# Patient Record
Sex: FEMALE | Race: WHITE | NOT HISPANIC OR LATINO
[De-identification: names, ages, dates, MRNs, and addresses within clinical notes are randomized per-mention and may not be internally consistent; named-entity substitution may affect disease eponyms.]

---

## 2018-04-11 ENCOUNTER — APPOINTMENT (OUTPATIENT)
Dept: MRI IMAGING | Facility: CLINIC | Age: 83
End: 2018-04-11
Payer: MEDICARE

## 2018-04-11 ENCOUNTER — OUTPATIENT (OUTPATIENT)
Dept: OUTPATIENT SERVICES | Facility: HOSPITAL | Age: 83
LOS: 1 days | End: 2018-04-11
Payer: MEDICARE

## 2018-04-11 DIAGNOSIS — Z00.8 ENCOUNTER FOR OTHER GENERAL EXAMINATION: ICD-10-CM

## 2018-04-11 PROCEDURE — 73721 MRI JNT OF LWR EXTRE W/O DYE: CPT

## 2018-04-11 PROCEDURE — 73721 MRI JNT OF LWR EXTRE W/O DYE: CPT | Mod: 26,LT

## 2020-09-29 ENCOUNTER — APPOINTMENT (OUTPATIENT)
Dept: PULMONOLOGY | Facility: CLINIC | Age: 85
End: 2020-09-29
Payer: MEDICARE

## 2020-09-29 VITALS
SYSTOLIC BLOOD PRESSURE: 149 MMHG | BODY MASS INDEX: 33.04 KG/M2 | TEMPERATURE: 97.8 F | HEIGHT: 61 IN | DIASTOLIC BLOOD PRESSURE: 77 MMHG | WEIGHT: 175 LBS | HEART RATE: 69 BPM | RESPIRATION RATE: 16 BRPM

## 2020-09-29 DIAGNOSIS — E78.5 HYPERLIPIDEMIA, UNSPECIFIED: ICD-10-CM

## 2020-09-29 PROCEDURE — 99203 OFFICE O/P NEW LOW 30 MIN: CPT

## 2020-09-29 NOTE — HISTORY OF PRESENT ILLNESS
[TextBox_4] : 88 year old female with pmh lung cancer (2018 s/p right lobectomy), HTN, HLD, hypothyroid presents for second opinion regarding recurrence of lung CA.  Pt had CT chest in May 2020 which showed recurrence in right upper and middle lobe; she states she had a bronchoscopy which was insufficient in determining malignancy.  She wanted a second opinion but was unable to schedule an appointment for a while because of COVID. She remembers having thoracentesis also. \par When lung cancer was initially diagnosed in 2018 had a lobectomy but chemo or radiation were not required.  Pt was coughing constantly and this is what prompted her being further evaluated.  \par Currently, pt is not having any symptoms other than orthopnea.  She denies fever, chills, shortness of breath, cough, wheezing, chest discomfort.\par She has been a lifetime non smoker and does not have any occupational exposures.  \par They do not have any medical records with them at this time.  Pt is taking synthroid for her thyroid but does not recall the names of her HTN and HLD medications.

## 2020-09-29 NOTE — END OF VISIT
[FreeTextEntry3] : I obtained the history and examined the patient and developed a plan of care  Gonzalo Mendoza MD\par

## 2020-09-29 NOTE — ASSESSMENT
[FreeTextEntry1] : 88 year old female with pmh lung cancer (2018 s/p right lobectomy), HTN, HLD, hypothyroid presents for second opinion regarding recurrence of lung CA.  Will need to obtain operative and path reports from Dr. Garcia (surgeon)\par Discussed all types of therapy; patient refuses chem or radiation therapy\par Question re EGFR or PDL-1 status

## 2020-09-29 NOTE — PHYSICAL EXAM
[No Acute Distress] : no acute distress [Normal Appearance] : normal appearance [Normal Rate/Rhythm] : normal rate/rhythm [Normal S1, S2] : normal s1, s2 [No Murmurs] : no murmurs [No Resp Distress] : no resp distress [No Abnormalities] : no abnormalities [Benign] : benign [Normal Gait] : normal gait [No Clubbing] : no clubbing [No Cyanosis] : no cyanosis [No Edema] : no edema [Normal Color/ Pigmentation] : normal color/ pigmentation [No Focal Deficits] : no focal deficits [Oriented x3] : oriented x3 [Normal Affect] : normal affect [TextBox_68] : crackles to right lung base; dullness to percussion

## 2020-10-02 ENCOUNTER — OUTPATIENT (OUTPATIENT)
Dept: OUTPATIENT SERVICES | Facility: HOSPITAL | Age: 85
LOS: 1 days | End: 2020-10-02
Payer: MEDICARE

## 2020-10-02 ENCOUNTER — RESULT REVIEW (OUTPATIENT)
Age: 85
End: 2020-10-02

## 2020-10-02 ENCOUNTER — APPOINTMENT (OUTPATIENT)
Dept: CT IMAGING | Facility: IMAGING CENTER | Age: 85
End: 2020-10-02
Payer: MEDICARE

## 2020-10-02 DIAGNOSIS — C34.90 MALIGNANT NEOPLASM OF UNSPECIFIED PART OF UNSPECIFIED BRONCHUS OR LUNG: ICD-10-CM

## 2020-10-02 PROCEDURE — 71250 CT THORAX DX C-: CPT

## 2020-10-02 PROCEDURE — 71250 CT THORAX DX C-: CPT | Mod: 26

## 2020-10-29 ENCOUNTER — NON-APPOINTMENT (OUTPATIENT)
Age: 85
End: 2020-10-29

## 2020-11-21 ENCOUNTER — OUTPATIENT (OUTPATIENT)
Dept: OUTPATIENT SERVICES | Facility: HOSPITAL | Age: 85
LOS: 1 days | End: 2020-11-21
Payer: MEDICARE

## 2020-11-21 ENCOUNTER — RESULT REVIEW (OUTPATIENT)
Age: 85
End: 2020-11-21

## 2020-11-21 ENCOUNTER — APPOINTMENT (OUTPATIENT)
Dept: MRI IMAGING | Facility: CLINIC | Age: 85
End: 2020-11-21
Payer: MEDICARE

## 2020-11-21 DIAGNOSIS — Z00.8 ENCOUNTER FOR OTHER GENERAL EXAMINATION: ICD-10-CM

## 2020-11-21 PROCEDURE — 70553 MRI BRAIN STEM W/O & W/DYE: CPT

## 2020-11-21 PROCEDURE — A9585: CPT

## 2020-11-21 PROCEDURE — 70553 MRI BRAIN STEM W/O & W/DYE: CPT | Mod: 26

## 2020-12-22 ENCOUNTER — NON-APPOINTMENT (OUTPATIENT)
Age: 85
End: 2020-12-22

## 2021-01-25 ENCOUNTER — OUTPATIENT (OUTPATIENT)
Dept: OUTPATIENT SERVICES | Facility: HOSPITAL | Age: 86
LOS: 1 days | End: 2021-01-25
Payer: MEDICARE

## 2021-01-25 ENCOUNTER — RESULT REVIEW (OUTPATIENT)
Age: 86
End: 2021-01-25

## 2021-01-25 ENCOUNTER — APPOINTMENT (OUTPATIENT)
Dept: CT IMAGING | Facility: IMAGING CENTER | Age: 86
End: 2021-01-25
Payer: MEDICARE

## 2021-01-25 DIAGNOSIS — C34.90 MALIGNANT NEOPLASM OF UNSPECIFIED PART OF UNSPECIFIED BRONCHUS OR LUNG: ICD-10-CM

## 2021-01-25 PROCEDURE — 71250 CT THORAX DX C-: CPT

## 2021-01-25 PROCEDURE — G1004: CPT

## 2021-01-25 PROCEDURE — 71250 CT THORAX DX C-: CPT | Mod: 26,ME

## 2021-04-29 ENCOUNTER — APPOINTMENT (OUTPATIENT)
Dept: PULMONOLOGY | Facility: CLINIC | Age: 86
End: 2021-04-29
Payer: MEDICARE

## 2021-04-29 VITALS
BODY MASS INDEX: 30.58 KG/M2 | HEART RATE: 76 BPM | WEIGHT: 162 LBS | SYSTOLIC BLOOD PRESSURE: 172 MMHG | DIASTOLIC BLOOD PRESSURE: 62 MMHG | TEMPERATURE: 97 F | HEIGHT: 61 IN | RESPIRATION RATE: 15 BRPM | OXYGEN SATURATION: 95 %

## 2021-04-29 DIAGNOSIS — Z78.9 OTHER SPECIFIED HEALTH STATUS: ICD-10-CM

## 2021-04-29 PROCEDURE — 99213 OFFICE O/P EST LOW 20 MIN: CPT

## 2021-04-29 NOTE — HISTORY OF PRESENT ILLNESS
[TextBox_4] : 80-year-old female previously operated on for an adenocarcinoma having undergone a right lower lobectomy. Apparently she had a recurrence in no right lung along with a pleural effusion. Bronchoscopy and thoracentesis failed to disclose any positive cytologies. Followup CAT scans have demonstrated consolidation in the right middle and upper lobes with no evidence of effusion. The patient complains of a cough that is occasionally productive. There were no significant mutations in the lung cancer and the patient has refused any chemotherapy and/or radiation therapy. Her appetite is satisfactory but she has lost some weight. She denies any chest pain or fevers.

## 2021-04-29 NOTE — PHYSICAL EXAM
[No Acute Distress] : no acute distress [Normal Appearance] : normal appearance [Normal Rate/Rhythm] : normal rate/rhythm [Normal S1, S2] : normal s1, s2 [No Resp Distress] : no resp distress [No Clubbing] : no clubbing [No Edema] : no edema [TextBox_68] : crackles, bronchial breathing and dullness to percussion right lower lung

## 2021-04-29 NOTE — ASSESSMENT
[FreeTextEntry1] : we again discussed in detail any followup therapy which the patient refuses. Surprisingly, she has very little in the way of symptoms and is tolerating her lung cancer recurrence. I. explained in detail to her to contact me should she have any new symptoms. I explained this to her daughter also. The patient lives independently and wants to remain that way.

## 2021-05-18 ENCOUNTER — NON-APPOINTMENT (OUTPATIENT)
Age: 86
End: 2021-05-18

## 2021-06-07 ENCOUNTER — APPOINTMENT (OUTPATIENT)
Dept: CT IMAGING | Facility: IMAGING CENTER | Age: 86
End: 2021-06-07
Payer: MEDICARE

## 2021-06-07 ENCOUNTER — OUTPATIENT (OUTPATIENT)
Dept: OUTPATIENT SERVICES | Facility: HOSPITAL | Age: 86
LOS: 1 days | End: 2021-06-07
Payer: MEDICARE

## 2021-06-07 DIAGNOSIS — C34.90 MALIGNANT NEOPLASM OF UNSPECIFIED PART OF UNSPECIFIED BRONCHUS OR LUNG: ICD-10-CM

## 2021-06-07 PROCEDURE — 71250 CT THORAX DX C-: CPT | Mod: 26,ME

## 2021-06-07 PROCEDURE — 71250 CT THORAX DX C-: CPT

## 2021-06-07 PROCEDURE — G1004: CPT

## 2021-06-09 LAB
ALBUMIN SERPL ELPH-MCNC: 3.7 G/DL
ALP BLD-CCNC: 90 U/L
ALT SERPL-CCNC: 11 U/L
ANION GAP SERPL CALC-SCNC: 12 MMOL/L
AST SERPL-CCNC: 21 U/L
BASOPHILS # BLD AUTO: 0.06 K/UL
BASOPHILS NFR BLD AUTO: 0.7 %
BILIRUB SERPL-MCNC: 0.2 MG/DL
BUN SERPL-MCNC: 14 MG/DL
CALCIUM SERPL-MCNC: 9.1 MG/DL
CHLORIDE SERPL-SCNC: 101 MMOL/L
CO2 SERPL-SCNC: 26 MMOL/L
COVID-19 NUCLEOCAPSID  GAM ANTIBODY INTERPRETATION: NEGATIVE
CREAT SERPL-MCNC: 0.71 MG/DL
EOSINOPHIL # BLD AUTO: 0.11 K/UL
EOSINOPHIL NFR BLD AUTO: 1.2 %
FERRITIN SERPL-MCNC: 101 NG/ML
GLUCOSE SERPL-MCNC: 81 MG/DL
HCT VFR BLD CALC: 41 %
HGB BLD-MCNC: 12.8 G/DL
IMM GRANULOCYTES NFR BLD AUTO: 0.2 %
IRON SATN MFR SERPL: 12 %
IRON SERPL-MCNC: 37 UG/DL
LYMPHOCYTES # BLD AUTO: 2.17 K/UL
LYMPHOCYTES NFR BLD AUTO: 24.5 %
MAN DIFF?: NORMAL
MCHC RBC-ENTMCNC: 28 PG
MCHC RBC-ENTMCNC: 31.2 GM/DL
MCV RBC AUTO: 89.7 FL
MONOCYTES # BLD AUTO: 0.84 K/UL
MONOCYTES NFR BLD AUTO: 9.5 %
NEUTROPHILS # BLD AUTO: 5.66 K/UL
NEUTROPHILS NFR BLD AUTO: 63.9 %
PLATELET # BLD AUTO: 210 K/UL
POTASSIUM SERPL-SCNC: 4.8 MMOL/L
PROT SERPL-MCNC: 7.4 G/DL
RBC # BLD: 4.57 M/UL
RBC # FLD: 16 %
SARS-COV-2 AB SERPL QL IA: 0.09 INDEX
SODIUM SERPL-SCNC: 139 MMOL/L
TIBC SERPL-MCNC: 310 UG/DL
UIBC SERPL-MCNC: 273 UG/DL
WBC # FLD AUTO: 8.86 K/UL

## 2021-12-20 ENCOUNTER — INPATIENT (INPATIENT)
Facility: HOSPITAL | Age: 86
LOS: 1 days | Discharge: HOME HEALTH | End: 2021-12-22
Attending: INTERNAL MEDICINE | Admitting: INTERNAL MEDICINE
Payer: MEDICARE

## 2021-12-20 ENCOUNTER — APPOINTMENT (OUTPATIENT)
Dept: PULMONOLOGY | Facility: CLINIC | Age: 86
End: 2021-12-20
Payer: MEDICARE

## 2021-12-20 VITALS
HEART RATE: 86 BPM | TEMPERATURE: 98 F | DIASTOLIC BLOOD PRESSURE: 80 MMHG | RESPIRATION RATE: 18 BRPM | SYSTOLIC BLOOD PRESSURE: 134 MMHG | OXYGEN SATURATION: 97 %

## 2021-12-20 VITALS
DIASTOLIC BLOOD PRESSURE: 75 MMHG | SYSTOLIC BLOOD PRESSURE: 135 MMHG | HEART RATE: 85 BPM | WEIGHT: 162 LBS | HEIGHT: 61 IN | TEMPERATURE: 98 F | BODY MASS INDEX: 30.58 KG/M2 | RESPIRATION RATE: 16 BRPM

## 2021-12-20 DIAGNOSIS — Z90.2 ACQUIRED ABSENCE OF LUNG [PART OF]: Chronic | ICD-10-CM

## 2021-12-20 LAB
ALBUMIN SERPL ELPH-MCNC: 3.4 G/DL — SIGNIFICANT CHANGE UP (ref 3.3–5)
ALP SERPL-CCNC: 96 U/L — SIGNIFICANT CHANGE UP (ref 40–120)
ALT FLD-CCNC: 12 U/L — SIGNIFICANT CHANGE UP (ref 4–33)
ANION GAP SERPL CALC-SCNC: 8 MMOL/L — SIGNIFICANT CHANGE UP (ref 7–14)
AST SERPL-CCNC: 16 U/L — SIGNIFICANT CHANGE UP (ref 4–32)
B PERT DNA SPEC QL NAA+PROBE: SIGNIFICANT CHANGE UP
B PERT+PARAPERT DNA PNL SPEC NAA+PROBE: SIGNIFICANT CHANGE UP
BASOPHILS # BLD AUTO: 0.04 K/UL — SIGNIFICANT CHANGE UP (ref 0–0.2)
BASOPHILS NFR BLD AUTO: 0.5 % — SIGNIFICANT CHANGE UP (ref 0–2)
BILIRUB SERPL-MCNC: 0.2 MG/DL — SIGNIFICANT CHANGE UP (ref 0.2–1.2)
BORDETELLA PARAPERTUSSIS (RAPRVP): SIGNIFICANT CHANGE UP
BUN SERPL-MCNC: 5 MG/DL — LOW (ref 7–23)
C PNEUM DNA SPEC QL NAA+PROBE: SIGNIFICANT CHANGE UP
CALCIUM SERPL-MCNC: 9.1 MG/DL — SIGNIFICANT CHANGE UP (ref 8.4–10.5)
CHLORIDE SERPL-SCNC: 97 MMOL/L — LOW (ref 98–107)
CO2 SERPL-SCNC: 31 MMOL/L — SIGNIFICANT CHANGE UP (ref 22–31)
CREAT SERPL-MCNC: 0.66 MG/DL — SIGNIFICANT CHANGE UP (ref 0.5–1.3)
EOSINOPHIL # BLD AUTO: 0.07 K/UL — SIGNIFICANT CHANGE UP (ref 0–0.5)
EOSINOPHIL NFR BLD AUTO: 0.8 % — SIGNIFICANT CHANGE UP (ref 0–6)
FLUAV SUBTYP SPEC NAA+PROBE: SIGNIFICANT CHANGE UP
FLUBV RNA SPEC QL NAA+PROBE: SIGNIFICANT CHANGE UP
GLUCOSE SERPL-MCNC: 94 MG/DL — SIGNIFICANT CHANGE UP (ref 70–99)
HADV DNA SPEC QL NAA+PROBE: SIGNIFICANT CHANGE UP
HCOV 229E RNA SPEC QL NAA+PROBE: SIGNIFICANT CHANGE UP
HCOV HKU1 RNA SPEC QL NAA+PROBE: SIGNIFICANT CHANGE UP
HCOV NL63 RNA SPEC QL NAA+PROBE: SIGNIFICANT CHANGE UP
HCOV OC43 RNA SPEC QL NAA+PROBE: SIGNIFICANT CHANGE UP
HCT VFR BLD CALC: 39.4 % — SIGNIFICANT CHANGE UP (ref 34.5–45)
HGB BLD-MCNC: 12.3 G/DL — SIGNIFICANT CHANGE UP (ref 11.5–15.5)
HMPV RNA SPEC QL NAA+PROBE: SIGNIFICANT CHANGE UP
HPIV1 RNA SPEC QL NAA+PROBE: SIGNIFICANT CHANGE UP
HPIV2 RNA SPEC QL NAA+PROBE: SIGNIFICANT CHANGE UP
HPIV3 RNA SPEC QL NAA+PROBE: SIGNIFICANT CHANGE UP
HPIV4 RNA SPEC QL NAA+PROBE: SIGNIFICANT CHANGE UP
IANC: 5.29 K/UL — SIGNIFICANT CHANGE UP (ref 1.5–8.5)
IMM GRANULOCYTES NFR BLD AUTO: 0.2 % — SIGNIFICANT CHANGE UP (ref 0–1.5)
LYMPHOCYTES # BLD AUTO: 2.06 K/UL — SIGNIFICANT CHANGE UP (ref 1–3.3)
LYMPHOCYTES # BLD AUTO: 24.5 % — SIGNIFICANT CHANGE UP (ref 13–44)
M PNEUMO DNA SPEC QL NAA+PROBE: SIGNIFICANT CHANGE UP
MCHC RBC-ENTMCNC: 27.6 PG — SIGNIFICANT CHANGE UP (ref 27–34)
MCHC RBC-ENTMCNC: 31.2 GM/DL — LOW (ref 32–36)
MCV RBC AUTO: 88.3 FL — SIGNIFICANT CHANGE UP (ref 80–100)
MONOCYTES # BLD AUTO: 0.93 K/UL — HIGH (ref 0–0.9)
MONOCYTES NFR BLD AUTO: 11.1 % — SIGNIFICANT CHANGE UP (ref 2–14)
NEUTROPHILS # BLD AUTO: 5.29 K/UL — SIGNIFICANT CHANGE UP (ref 1.8–7.4)
NEUTROPHILS NFR BLD AUTO: 62.9 % — SIGNIFICANT CHANGE UP (ref 43–77)
NRBC # BLD: 0 /100 WBCS — SIGNIFICANT CHANGE UP
NRBC # FLD: 0 K/UL — SIGNIFICANT CHANGE UP
NT-PROBNP SERPL-SCNC: 390 PG/ML — HIGH
PLATELET # BLD AUTO: 264 K/UL — SIGNIFICANT CHANGE UP (ref 150–400)
POTASSIUM SERPL-MCNC: 4.3 MMOL/L — SIGNIFICANT CHANGE UP (ref 3.5–5.3)
POTASSIUM SERPL-SCNC: 4.3 MMOL/L — SIGNIFICANT CHANGE UP (ref 3.5–5.3)
PROT SERPL-MCNC: 8 G/DL — SIGNIFICANT CHANGE UP (ref 6–8.3)
RAPID RVP RESULT: SIGNIFICANT CHANGE UP
RBC # BLD: 4.46 M/UL — SIGNIFICANT CHANGE UP (ref 3.8–5.2)
RBC # FLD: 14.6 % — HIGH (ref 10.3–14.5)
RSV RNA SPEC QL NAA+PROBE: SIGNIFICANT CHANGE UP
RV+EV RNA SPEC QL NAA+PROBE: SIGNIFICANT CHANGE UP
SARS-COV-2 RNA SPEC QL NAA+PROBE: SIGNIFICANT CHANGE UP
SODIUM SERPL-SCNC: 136 MMOL/L — SIGNIFICANT CHANGE UP (ref 135–145)
TROPONIN T, HIGH SENSITIVITY RESULT: 13 NG/L — SIGNIFICANT CHANGE UP
WBC # BLD: 8.41 K/UL — SIGNIFICANT CHANGE UP (ref 3.8–10.5)
WBC # FLD AUTO: 8.41 K/UL — SIGNIFICANT CHANGE UP (ref 3.8–10.5)

## 2021-12-20 PROCEDURE — 71250 CT THORAX DX C-: CPT | Mod: 26,MA

## 2021-12-20 PROCEDURE — 71045 X-RAY EXAM CHEST 1 VIEW: CPT | Mod: 26

## 2021-12-20 PROCEDURE — 99213 OFFICE O/P EST LOW 20 MIN: CPT

## 2021-12-20 PROCEDURE — 99285 EMERGENCY DEPT VISIT HI MDM: CPT | Mod: CS,GC

## 2021-12-20 RX ORDER — ALBUTEROL 90 UG/1
2.5 AEROSOL, METERED ORAL
Refills: 0 | Status: COMPLETED | OUTPATIENT
Start: 2021-12-20 | End: 2021-12-20

## 2021-12-20 RX ADMIN — ALBUTEROL 2.5 MILLIGRAM(S): 90 AEROSOL, METERED ORAL at 18:35

## 2021-12-20 RX ADMIN — ALBUTEROL 2.5 MILLIGRAM(S): 90 AEROSOL, METERED ORAL at 19:20

## 2021-12-20 RX ADMIN — ALBUTEROL 2.5 MILLIGRAM(S): 90 AEROSOL, METERED ORAL at 18:57

## 2021-12-20 NOTE — ED PROVIDER NOTE - ATTENDING CONTRIBUTION TO CARE
agree with resident note    "89 Yr female k/c Hypothyroidism, Lung Ca. s/p Right Lobectomy, HTN now presenting with BL LE pitting edema for few weeks.   Patient also c/o a non-productive cough for many months. Denies fever, chest pain, nausea, vomiting, abdominal pain, diarrhea.   She was sent by PMD to ED for evaluation."    PE: mild resp distress; hacking cough; diffuse wheezing; s1 s2 no m/r/g abd soft/NT/ND ext: no edema    Imp: pitting edema, worsening cough, will get CXR, treat for RAD, reassess

## 2021-12-20 NOTE — ED PROVIDER NOTE - NS ED ROS FT
CONSTITUTIONAL: No weakness, fevers   EYES/ENT: No visual changes;  No vertigo or throat pain   NECK: No pain or stiffness  RESPIRATORY: c/o cough. No shortness of breath  CARDIOVASCULAR: No chest pain or palpitations, BL LE pitting edema.  GASTROINTESTINAL: Has reduced PO intake. No abdominal or epigastric pain. No nausea, vomiting, or hematemesis; No diarrhea or constipation. No melena or hematochezia.  GENITOURINARY: No dysuria, frequency or hematuria  NEUROLOGICAL: No numbness or weakness  SKIN: No rashes, or lesions     All other review of systems is negative unless indicated above.

## 2021-12-20 NOTE — ASSESSMENT
[FreeTextEntry1] : 89-year-old female previously operated on for an adenocarcinoma having undergone a right lower lobectomy presents with new onset shortness of breath, chest discomfort, lower extremity edema, and cough over the last week.  EMS has been called to take patient to the ED for further evaluation.

## 2021-12-20 NOTE — ED ADULT NURSE NOTE - INTERVENTIONS DEFINITIONS
[place] to call system/Call bell, personal items and telephone within reach/Instruct patient to call for assistance/Stretcher in lowest position, wheels locked, appropriate side rails in place

## 2021-12-20 NOTE — ED PROVIDER NOTE - PHYSICAL EXAMINATION
PHYSICAL EXAM:    GENERAL: NAD, lying in bed comfortably  HEAD:  Normocephalic  EYES: EOMI, PERRLA, conjunctiva and sclera clear  ENT: No erythema/pallor/petechiae/lesions; TMs clear b/l  NECK: Supple, No JVD  LUNG: BL Wheeze  HEART: RRR, +S1/S2; No m/r/g  ABDOMEN: soft, NT/ND; BS audible   EXTREMITIES:  2+ Peripheral Pulses, brisk cap refill. BL LE pitting edema. Area of redness, warmth and tenderness in left anterior lower leg.  NERVOUS SYSTEM:  AAOx3, speech clear. No sensory/motor deficits   SKIN: No rashes or lesions

## 2021-12-20 NOTE — ED ADULT NURSE NOTE - OBJECTIVE STATEMENT
Pt arrives to ED rm 19 ambulatory and A & Ox4. Pt complains of increasingly worse pitting edema bilaterally in the legs, worsening cough and SOB x2 weeks. Pt states that she has been more fatigued as of late leading to less walking, the swelling in the legs has been worsening gradually, as well as the SOB on exertion. Hx of right sided lobectomy due to benign masses. Pt has wheezes bilaterally, and pitting edema. Pt arrives to ED rm 19 ambulatory and A & Ox4. Pt complains of increasingly worse pitting edema bilaterally in the legs, worsening cough and SOB x2 weeks. Pt states that she has been more fatigued as of late leading to less walking, the swelling in the legs has been worsening gradually, as well as the SOB on exertion. Hx of right sided lobectomy due to benign masses. Pt has wheezes bilaterally and is tachypneic. Pt has bilateral pitting edema. Pedal pulses present and strong bilaterally. 20 G RAC, labs drawn and sent. Meds given as ordered. Awaiting chest xray

## 2021-12-20 NOTE — REVIEW OF SYSTEMS
[Fatigue] : fatigue [Chills] : chills [Poor Appetite] : poor appetite [Recent Wt Loss (___ Lbs)] : ~T recent [unfilled] lb weight loss [Cough] : cough [Chest Tightness] : chest tightness [Sputum] : sputum [Dyspnea] : dyspnea [Pleuritic Pain] : pleuritic pain [SOB on Exertion] : sob on exertion [Chest Discomfort] : chest discomfort [Negative] : HEENT

## 2021-12-20 NOTE — ED PROVIDER NOTE - OBJECTIVE STATEMENT
89 Yr female k/c Hypothyroidism, Lung Ca. s/p Right Lobectomy, HTN now presenting with BL LE pitting edema for few weeks.   Patient also c/o a non-productive cough for many months. Denies fever, chest pain, nausea, vomiting, abdominal pain, diarrhea.   She was sent by PMD to ED for evaluation.

## 2021-12-20 NOTE — PHYSICAL EXAM
[Normal Rate/Rhythm] : normal rate/rhythm [Normal S1, S2] : normal s1, s2 [No Murmurs] : no murmurs [2+ Pitting] : 2+ pitting [No Focal Deficits] : no focal deficits [Oriented x3] : oriented x3 [Normal Affect] : normal affect [TextBox_68] : tachypneic, wheezes to left side of lung, diffuse crackles, diminished breath sounds at bases

## 2021-12-20 NOTE — ED PROVIDER NOTE - CLINICAL SUMMARY MEDICAL DECISION MAKING FREE TEXT BOX
89 Yr female k/c Hypothyroidism, Lung Ca. s/p Right Lobectomy, HTN now presenting with BL LE pitting edema and chronic cough. VS wnl. PE BL wheeze, BL LE pitting edema, Left low leg redness, warmth (? Cellulitis). Plan for labs, CXR, Observe, +/- Admission.

## 2021-12-20 NOTE — ED PROVIDER NOTE - EKG ADDITIONAL QUESTION - PERFORMED INDEPENDENT VISUALIZATION
Requested Prescriptions     Pending Prescriptions Disp Refills    amLODIPine-Olmesartan 10-40 mg tab 90 Tab 3     Si qd Yes

## 2021-12-20 NOTE — HISTORY OF PRESENT ILLNESS
[TextBox_4] : 89-year-old female previously operated on for an adenocarcinoma having undergone a right lower lobectomy. Apparently she had a recurrence in no right lung along with a pleural effusion. Bronchoscopy and thoracentesis failed to disclose any positive cytologies. Followup CAT scans have demonstrated consolidation in the right middle and upper lobes with no evidence of effusion. She has been significantly short of breath along with severe lower extremity edema that started about 1 week ago.  She is not currently taking any diuretics.   There were no significant mutations in the lung cancer and the patient has refused any chemotherapy and/or radiation therapy. Her appetite has gone down significantly since last week as well. She has been having a heaviness in her chest for the last 4-5 days as well.

## 2021-12-21 DIAGNOSIS — Z29.9 ENCOUNTER FOR PROPHYLACTIC MEASURES, UNSPECIFIED: ICD-10-CM

## 2021-12-21 DIAGNOSIS — E78.5 HYPERLIPIDEMIA, UNSPECIFIED: ICD-10-CM

## 2021-12-21 DIAGNOSIS — Z96.643 PRESENCE OF ARTIFICIAL HIP JOINT, BILATERAL: Chronic | ICD-10-CM

## 2021-12-21 DIAGNOSIS — I10 ESSENTIAL (PRIMARY) HYPERTENSION: ICD-10-CM

## 2021-12-21 DIAGNOSIS — J18.9 PNEUMONIA, UNSPECIFIED ORGANISM: ICD-10-CM

## 2021-12-21 DIAGNOSIS — Z96.653 PRESENCE OF ARTIFICIAL KNEE JOINT, BILATERAL: Chronic | ICD-10-CM

## 2021-12-21 DIAGNOSIS — M79.89 OTHER SPECIFIED SOFT TISSUE DISORDERS: ICD-10-CM

## 2021-12-21 DIAGNOSIS — R06.02 SHORTNESS OF BREATH: ICD-10-CM

## 2021-12-21 PROCEDURE — 99223 1ST HOSP IP/OBS HIGH 75: CPT | Mod: GC

## 2021-12-21 PROCEDURE — 93306 TTE W/DOPPLER COMPLETE: CPT | Mod: 26

## 2021-12-21 PROCEDURE — 93308 TTE F-UP OR LMTD: CPT | Mod: 26

## 2021-12-21 PROCEDURE — 76604 US EXAM CHEST: CPT | Mod: 26

## 2021-12-21 PROCEDURE — 93970 EXTREMITY STUDY: CPT | Mod: 26

## 2021-12-21 PROCEDURE — 12345: CPT | Mod: NC

## 2021-12-21 PROCEDURE — 99223 1ST HOSP IP/OBS HIGH 75: CPT

## 2021-12-21 RX ORDER — ENOXAPARIN SODIUM 100 MG/ML
40 INJECTION SUBCUTANEOUS AT BEDTIME
Refills: 0 | Status: DISCONTINUED | OUTPATIENT
Start: 2021-12-21 | End: 2021-12-22

## 2021-12-21 RX ORDER — INFLUENZA VIRUS VACCINE 15; 15; 15; 15 UG/.5ML; UG/.5ML; UG/.5ML; UG/.5ML
0.7 SUSPENSION INTRAMUSCULAR ONCE
Refills: 0 | Status: DISCONTINUED | OUTPATIENT
Start: 2021-12-21 | End: 2021-12-22

## 2021-12-21 RX ORDER — LEVOTHYROXINE SODIUM 125 MCG
1 TABLET ORAL
Qty: 0 | Refills: 0 | DISCHARGE

## 2021-12-21 RX ORDER — LEVOTHYROXINE SODIUM 125 MCG
75 TABLET ORAL DAILY
Refills: 0 | Status: DISCONTINUED | OUTPATIENT
Start: 2021-12-21 | End: 2021-12-22

## 2021-12-21 RX ORDER — OLMESARTAN MEDOXOMIL 5 MG/1
1 TABLET, FILM COATED ORAL
Qty: 0 | Refills: 0 | DISCHARGE

## 2021-12-21 RX ORDER — ROSUVASTATIN CALCIUM 5 MG/1
1 TABLET ORAL
Qty: 0 | Refills: 0 | DISCHARGE

## 2021-12-21 RX ORDER — LANOLIN ALCOHOL/MO/W.PET/CERES
3 CREAM (GRAM) TOPICAL AT BEDTIME
Refills: 0 | Status: DISCONTINUED | OUTPATIENT
Start: 2021-12-21 | End: 2021-12-22

## 2021-12-21 RX ORDER — ONDANSETRON 8 MG/1
4 TABLET, FILM COATED ORAL EVERY 8 HOURS
Refills: 0 | Status: DISCONTINUED | OUTPATIENT
Start: 2021-12-21 | End: 2021-12-22

## 2021-12-21 RX ORDER — AZITHROMYCIN 500 MG/1
500 TABLET, FILM COATED ORAL ONCE
Refills: 0 | Status: COMPLETED | OUTPATIENT
Start: 2021-12-21 | End: 2021-12-21

## 2021-12-21 RX ORDER — FUROSEMIDE 40 MG
40 TABLET ORAL ONCE
Refills: 0 | Status: COMPLETED | OUTPATIENT
Start: 2021-12-21 | End: 2021-12-21

## 2021-12-21 RX ORDER — ACETAMINOPHEN 500 MG
650 TABLET ORAL EVERY 6 HOURS
Refills: 0 | Status: DISCONTINUED | OUTPATIENT
Start: 2021-12-21 | End: 2021-12-22

## 2021-12-21 RX ORDER — LOSARTAN POTASSIUM 100 MG/1
50 TABLET, FILM COATED ORAL DAILY
Refills: 0 | Status: DISCONTINUED | OUTPATIENT
Start: 2021-12-21 | End: 2021-12-22

## 2021-12-21 RX ORDER — CEFTRIAXONE 500 MG/1
1000 INJECTION, POWDER, FOR SOLUTION INTRAMUSCULAR; INTRAVENOUS ONCE
Refills: 0 | Status: COMPLETED | OUTPATIENT
Start: 2021-12-21 | End: 2021-12-21

## 2021-12-21 RX ORDER — ATORVASTATIN CALCIUM 80 MG/1
40 TABLET, FILM COATED ORAL AT BEDTIME
Refills: 0 | Status: DISCONTINUED | OUTPATIENT
Start: 2021-12-21 | End: 2021-12-22

## 2021-12-21 RX ADMIN — AZITHROMYCIN 255 MILLIGRAM(S): 500 TABLET, FILM COATED ORAL at 05:01

## 2021-12-21 RX ADMIN — ATORVASTATIN CALCIUM 40 MILLIGRAM(S): 80 TABLET, FILM COATED ORAL at 22:33

## 2021-12-21 RX ADMIN — ENOXAPARIN SODIUM 40 MILLIGRAM(S): 100 INJECTION SUBCUTANEOUS at 22:32

## 2021-12-21 RX ADMIN — Medication 75 MICROGRAM(S): at 06:12

## 2021-12-21 RX ADMIN — Medication 40 MILLIGRAM(S): at 06:12

## 2021-12-21 RX ADMIN — LOSARTAN POTASSIUM 50 MILLIGRAM(S): 100 TABLET, FILM COATED ORAL at 06:12

## 2021-12-21 RX ADMIN — CEFTRIAXONE 100 MILLIGRAM(S): 500 INJECTION, POWDER, FOR SOLUTION INTRAMUSCULAR; INTRAVENOUS at 00:33

## 2021-12-21 NOTE — ED ADULT NURSE REASSESSMENT NOTE - NS ED NURSE REASSESS COMMENT FT1
pt. remains A&Ox4, awake and resting. respirations even and unlabored. no acute distress noted. report given to [facility] 3
pt. remains A&Ox4, awake and resting. pt. offers no new complaints at this time. pt. in no acute distress. pt. denies any type of pain at this time. respirations even and unlabored. VS as noted. pending dispo
Pt received albuterol treatment x3. Pt states that she is breathing easier, but she is still coughing with slight wheeze on auscultation. Vitals as charted.

## 2021-12-21 NOTE — H&P ADULT - NSICDXPASTMEDICALHX_GEN_ALL_CORE_FT
PAST MEDICAL HISTORY:  HLD (hyperlipidemia)     HTN (hypertension)     Hypothyroid     Lung cancer

## 2021-12-21 NOTE — PATIENT PROFILE ADULT - LONGEST PERIOD TOBACCO-FREE
Encounter addended by: Lashawn Milton OTR on: 10/5/2020 3:32 PM   Actions taken: Clinical Note Signed, Flowsheet accepted 40

## 2021-12-21 NOTE — H&P ADULT - NSICDXPASTSURGICALHX_GEN_ALL_CORE_FT
PAST SURGICAL HISTORY:  S/P lobectomy of lung     Status post total bilateral knee replacement     Status post total replacement of both hips

## 2021-12-21 NOTE — PROGRESS NOTE ADULT - ASSESSMENT
89F Hx of lung cancer s/p R lower lobectomy (no chemo/radiation) who is presenting with lower extremity swelling over the last few months. TTE sig for normal EF w significant AS. No signs of infection, O2 support PRN with gentle diuresis. Pulmonology following.

## 2021-12-21 NOTE — CONSULT NOTE ADULT - ASSESSMENT
89YO Female PMH Lung adenocarcinoma s/p RLL lobectomy (refused chemo/radiation) w/ pleural effusion and CT scans showing RML and RUL consolidation, htn, hld, and hypothyroid who presented 12/20 with several month hx of worsening dyspnea, cough, fatigue and swelling of her lower extremities.   Pulmonary consulted for progressive shortness of breath in the setting of lung adenocarcinoma.    Recs:  - RUL and RML consolidations previous noted  - Pleural effusion small to moderate in size but also noted to be present 6/2021  - Please obtain Echo  - F/u TSH in the setting of hypothyroidism  -       Devon Mallory PGY-5  Pulmonary/Critical Care Fellow  Pager: [phone] (K[facility]), [phone] (NS)  Pulmonary Spectra #[id number] (NS) / [phone] (K[facility]AL)       89YO Female PMH Lung adenocarcinoma s/p RLL lobectomy (declined chemo/radiation) w/ pleural effusion and CT scans showing RML and RUL consolidation, HTN, HLD, and hypothyroidism who presented 12/20 with several month hx of worsening dyspnea, cough, fatigue and swelling of her lower extremities.   Pulmonary consulted for progressive shortness of breath in the setting of lung adenocarcinoma.    Recs:  - RUL and RML consolidations previous noted  - Pleural effusion small to moderate in size but also noted to be present 6/2021  - Please obtain Echo  - Would gently diurese to help with volume though avoid over diuresis as pt has a systolic ejection murmur which may represent AS  - F/u TSH in the setting of hypothyroidism      Devon Mallory PGY-5  Pulmonary/Critical Care Fellow  Pager: [phone] ([facility]), [phone] (NS)  Pulmonary Spectra #[id number] (NS) / [phone] (K[facility]AL)       89YO Female PMH Lung adenocarcinoma s/p RLL lobectomy (declined chemo/radiation) w/ pleural effusion and CT scans showing RML and RUL consolidation, HTN, HLD, and hypothyroidism who presented 12/20 with several month hx of worsening dyspnea, cough, fatigue and swelling of her lower extremities.   Pulmonary consulted for progressive shortness of breath in the setting of lung adenocarcinoma.    Recs:  - RUL and RML consolidations previous noted  - Pleural effusion small to moderate in size but also noted to be present 6/2021  - Right Pleural effusion ultrasounded at bedside today with window being too small to safely access for thoracentesis  - Effusion size makes it unlikely that it is causing her SOB  - Highly suspect that pt is volume overloaded and that source of dyspnea is Heart Failure related  - Please obtain Echo  - Would gently diurese with Lasix 20mg IV   - Strict In and Out  - Diurese net negative 500-1L  - F/u TSH in the setting of hypothyroidism    Case discussed with Dr. Matt Mallory PGY-5  Pulmonary/Critical Care Fellow  Pager: [phone] ([facility]), [phone] (NS)  Pulmonary Spectra #[id number] (NS) / [phone] ([facility]AL)

## 2021-12-21 NOTE — H&P ADULT - PROBLEM SELECTOR PLAN 3
-cont home med statin 10 mg (pt was not sure if this was lipitor and outpt records do no show this, will contact her daughter in the AM) -cont home med statin 10 mg (pt was not sure if this was lipitor and outpt records do show crestor 10 was last picked up on 9/2021)  -confirm with daughter in the AM

## 2021-12-21 NOTE — H&P ADULT - NSHPPHYSICALEXAM_GEN_ALL_CORE
Gen: awake, alert, WD, WN, NAD  Head:  NC, AT  ENT:  PERRL, moist mucous membranes, OP-clear  Neck: supple, nontender  CV:  S1 S2 with possible S3 heard on JAZZ border,, RRR, no murmurs appreciated   Pulm:  crackles heard in right anterior in middle posterior lobe, coughing and wheezing also heard, left lung CTA   Abd:  Soft, nontender, nondistended, +BS, no rebound/guarding  Back:  tender to palpation (minimally) in the lower back, non localized   Ext:  warm, well perfused, moving all extremities spontaneously, 2+ peripheral edema in LE, distal pulses intact  Skin: lower ext skin with scale, non erythematous, no ulceration appreciated.   Neuro:  A&Ox3, no focal neuro deficit, speech clear

## 2021-12-21 NOTE — H&P ADULT - PROBLEM SELECTOR PLAN 1
-pt has chronic SOB, lethargy and intermittently dry or productive cough  -CT imaging shows fairly consistent CT findings since June, with increase of right pleural effusion  -pt has elevated pro BNP to 390 in the setting of lower ext swelling CAN suggest HF  -TTE in the AM to evaluate new HF diagnosis in pt  -cards consult in the AM   -f/u pulm recs in the AM as pt is known to Dr Mendoza and he sent her to the ED  -can start with PO lasix 40 now and monitor strict I/Os, see how pt responds  -pt already on at home ARB (olmesartan 20mg daily)    -will additionally check TSH as pt is known hypothyroid and lethargy can be hormonal as opposed to SOB -pt has chronic SOB, lethargy and intermittently dry or productive cough  -CT imaging shows fairly consistent CT findings since June, with increase of right pleural effusion  -pt has elevated pro BNP to 390 in the setting of lower ext swelling CAN suggest HF  -TTE in the AM to evaluate new HF diagnosis in pt  -cards consult  AFTER TTE helps elucidate HF picture  -f/u pulm recs in the AM as pt is known to Dr Mendoza and he sent her to the ED  -can start with PO lasix 40 now and monitor strict I/Os, see how pt responds  -pt already on at home ARB (olmesartan 20mg daily)    -will additionally check TSH as pt is known hypothyroid and lethargy can be hormonal as opposed to SOB

## 2021-12-21 NOTE — H&P ADULT - ASSESSMENT
88 yo female with pmh of lung cancer s/p lobectomy is here for increased SOB and lethargy over the course of several months. Pt has fairly unchanged Ct chest findings except new pleural effusion. in the setting of lower ext swelling we must rule out HF etiology. Pt has consolidations on imaging that can represent PNA, however afebrile and no leukocytosis, much less likely in ddx. Pt did also states she felt marginally better with albuterol nebs and can have asthma but most likely not her primary  of current HPI.

## 2021-12-21 NOTE — H&P ADULT - HISTORY OF PRESENT ILLNESS
89 y/o female with pmh of lung cancer s/p lobectomy (no chemo or radiation), htn, hld, and hypothyroid presents with several month hx of worsening dyspnea, cough, fatigue and swelling of her lower extremities. She was sent her by her pulmonologist Dr. Gonzalo Mendoza who said her cont'd cough needs to be evaluated in the ED. Pt denies recent illnesses or fevers or sick contacts.

## 2021-12-21 NOTE — CONSULT NOTE ADULT - SUBJECTIVE AND OBJECTIVE BOX
CHIEF COMPLAINT: Shortness of breath    HPI:  89YO Female PMH Lung adenocarcinoma s/p RLL lobectomy (refused chemo/radiation) w/ pleural effusion and CT scans showing RML and RUL consolidation, htn, hld, and hypothyroid who presented 12/20 with several month hx of worsening dyspnea, cough, fatigue and swelling of her lower extremities.     She reported several months of worsening dyspnea, non-productive cough and lower extremity edema. She denied fever, chest pain, nausea, vomiting, abdominal pain, diarrhea.   several month hx of worsening dyspnea, cough, fatigue and swelling of her lower extremities.    She was seen at Dr. Mendoza's office and was noted to have progressive shortness of breath with lower extremity edema with concerns for volume overload state. She was then directed to the [facility] ER.    In ED, pt was afebrile, normotensive and saturating 97% on room air. RVP and SARDS-CoV-2 PCR were negative. Pt was without leukocytosis. CT chest was done showing similar to mild increase of consolidations in RUL and RML as well as inrease of pleural effusion which is now small to moderate in size. Pt was admitted to medicine.     Pulmonary consulted for progressive shortness of breath in the setting of lung adenocarcinoma.    Of note, pt has had previous CT chest with RUL and RML consolidations which have been progressing. She also was noted in 6/2021 to have a small pleural effusion. She has previous declined chemotherapy and radiation.     PAST MEDICAL & SURGICAL HISTORY:  HTN (hypertension)    Lung cancer    Hypothyroid    HLD (hyperlipidemia)    S/P lobectomy of lung    Status post total replacement of both hips    Status post total bilateral knee replacement        FAMILY HISTORY:      SOCIAL HISTORY:  Smoking: [ ] Never Smoked [ ] Former Smoker (__ packs x ___ years) [ ] Current Smoker  (__ packs x ___ years)  Substance Use: [ ] Never Used [ ] Used ____  EtOH Use:  Marital Status: [ ] Single [ ]  [ ]  [ ]   Sexual History:   Occupation:  Recent Travel:  Country of Birth:  Advance Directives:    Allergies    No Known Allergies    Intolerances        HOME MEDICATIONS:  Home Medications:  levothyroxine 75 mcg (0.075 mg) oral tablet: 1 tab(s) orally once a day (21 Dec 2021 05:35)  olmesartan 20 mg oral tablet: 1 tab(s) orally once a day (21 Dec 2021 05:35)  rosuvastatin 10 mg oral tablet: 1 tab(s) orally once a day (21 Dec 2021 05:35)      REVIEW OF SYSTEMS:  Constitutional: [ ] negative [ ] fevers [ ] chills [ ] weight loss [ ] weight gain  HEENT: [ ] negative [ ] dry eyes [ ] eye irritation [ ] postnasal drip [ ] nasal congestion  CV: [ ] negative  [ ] chest pain [ ] orthopnea [ ] palpitations [ ] murmur  Resp: [ ] negative [ ] cough [ ] shortness of breath [ ] dyspnea [ ] wheezing [ ] sputum [ ] hemoptysis  GI: [ ] negative [ ] nausea [ ] vomiting [ ] diarrhea [ ] constipation [ ] abd pain [ ] dysphagia   : [ ] negative [ ] dysuria [ ] nocturia [ ] hematuria [ ] increased urinary frequency  Musculoskeletal: [ ] negative [ ] back pain [ ] myalgias [ ] arthralgias [ ] fracture  Skin: [ ] negative [ ] rash [ ] itch  Neurological: [ ] negative [ ] headache [ ] dizziness [ ] syncope [ ] weakness [ ] numbness  Psychiatric: [ ] negative [ ] anxiety [ ] depression  Endocrine: [ ] negative [ ] diabetes [ ] thyroid problem  Hematologic/Lymphatic: [ ] negative [ ] anemia [ ] bleeding problem  Allergic/Immunologic: [ ] negative [ ] itchy eyes [ ] nasal discharge [ ] hives [ ] angioedema  [ ] All other systems negative  [ ] Unable to assess ROS because ________    OBJECTIVE:  ICU Vital Signs Last 24 Hrs  T(C): 36.5 (21 Dec 2021 06:15), Max: 36.7 (20 Dec 2021 16:55)  T(F): 97.7 (21 Dec 2021 06:15), Max: 98 (20 Dec 2021 16:55)  HR: 81 (21 Dec 2021 06:00) (81 - 92)  BP: 136/66 (21 Dec 2021 06:00) (127/49 - 137/48)  BP(mean): --  ABP: --  ABP(mean): --  RR: 20 (21 Dec 2021 06:00) (18 - 20)  SpO2: 97% (21 Dec 2021 06:00) (96% - 100%)        CAPILLARY BLOOD GLUCOSE          PHYSICAL EXAM:  General:   HEENT:   Lymph Nodes:  Neck:   Respiratory:   Cardiovascular:   Abdomen:   Extremities:   Skin:   Neurological:  Psychiatry:    LINES:     HOSPITAL MEDICATIONS:  Standing Meds:  atorvastatin 40 milliGRAM(s) Oral at bedtime  enoxaparin Injectable 40 milliGRAM(s) SubCutaneous at bedtime  influenza  Vaccine (HIGH DOSE) 0.7 milliLiter(s) IntraMuscular once  levothyroxine 75 MICROGram(s) Oral daily  losartan 50 milliGRAM(s) Oral daily      PRN Meds:  acetaminophen     Tablet .. 650 milliGRAM(s) Oral every 6 hours PRN  aluminum hydroxide/magnesium hydroxide/simethicone Suspension 30 milliLiter(s) Oral every 4 hours PRN  benzonatate 100 milliGRAM(s) Oral three times a day PRN  melatonin 3 milliGRAM(s) Oral at bedtime PRN  ondansetron Injectable 4 milliGRAM(s) IV Push every 8 hours PRN      LABS:                        12.3   8.41  )-----------( 264      ( 20 Dec 2021 19:06 )             39.4     Hgb Trend: 12.3<--  12-20    136  |  97<L>  |  5<L>  ----------------------------<  94  4.3   |  31  |  0.66    Ca    9.1      20 Dec 2021 19:06    TPro  8.0  /  Alb  3.4  /  TBili  0.2  /  DBili  x   /  AST  16  /  ALT  12  /  AlkPhos  96  12-20    Creatinine Trend: 0.66<--            MICROBIOLOGY:       RADIOLOGY:  [ ] Reviewed and interpreted by me    PULMONARY FUNCTION TESTS:    EKG: CHIEF COMPLAINT: Shortness of breath    HPI:  88YO Female PMH Lung adenocarcinoma s/p RLL lobectomy (declined chemo/radiation) w/ pleural effusion and CT scans showing RML and RUL consolidation, HTN, HLD, and hypothyroidism who presented 12/20 with several month hx of worsening dyspnea, cough, fatigue and swelling of her lower extremities.     She reported almost a year of worsening dyspnea, non-productive cough and lower extremity edema. She denied fever, chest pain, nausea, vomiting, abdominal pain, diarrhea.   She reports orthopnea.     She was seen at Dr. Mendoza's office and was noted to have progressive shortness of breath with lower extremity edema with concerns for volume overload state. She was then directed to the [facility] ER.    In ED, pt was afebrile, normotensive and saturating 97% on room air. RVP and SARDS-CoV-2 PCR were negative. Pt was without leukocytosis. CT chest was done showing similar to mild increase of consolidations in RUL and RML as well as inrease of pleural effusion which is now small to moderate in size. Pt was admitted to medicine.     Pulmonary consulted for progressive shortness of breath in the setting of lung adenocarcinoma.    Of note, pt has had previous CT chest with RUL and RML consolidations which have been progressing. She also was noted in 6/2021 to have a small pleural effusion. She has previous declined chemotherapy and radiation.     PAST MEDICAL & SURGICAL HISTORY:  HTN (hypertension)  Lung cancer  Hypothyroid  HLD (hyperlipidemia)  S/P lobectomy of lung  Status post total replacement of both hips  Status post total bilateral knee replacement    FAMILY HISTORY:  Denies hx of heart disease, lung disease or cancer    SOCIAL HISTORY:  Smoking: occasional socially when she was young many years ago  Substance Use: Denies  EtOH Use: Denies    Allergies  No Known Allergies  Intolerances      HOME MEDICATIONS:  Home Medications:  levothyroxine 75 mcg (0.075 mg) oral tablet: 1 tab(s) orally once a day (21 Dec 2021 05:35)  olmesartan 20 mg oral tablet: 1 tab(s) orally once a day (21 Dec 2021 05:35)  rosuvastatin 10 mg oral tablet: 1 tab(s) orally once a day (21 Dec 2021 05:35)      REVIEW OF SYSTEMS:  Constitutional: [ ] negative [ ] fevers [ ] chills [ ] weight loss [ ] weight gain  HEENT: [ ] negative [ ] dry eyes [ ] eye irritation [ ] postnasal drip [ ] nasal congestion  CV: [ ] negative  [ ] chest pain [x ] orthopnea [ ] palpitations [ ] murmur  Resp: [ ] negative [x ] cough [x ] shortness of breath [ ] dyspnea [ ] wheezing [ ] sputum [ ] hemoptysis  GI: [ ] negative [ ] nausea [ ] vomiting [ ] diarrhea [ ] constipation [ ] abd pain [ ] dysphagia   : [ ] negative [ ] dysuria [ ] nocturia [ ] hematuria [ ] increased urinary frequency  Musculoskeletal: [ ] negative [ ] back pain [ ] myalgias [ ] arthralgias [ ] fracture  Skin: [ ] negative [ ] rash [ ] itch  Neurological: [ ] negative [ ] headache [ ] dizziness [ ] syncope [ ] weakness [ ] numbness  Psychiatric: [ ] negative [ ] anxiety [ ] depression  Endocrine: [ ] negative [ ] diabetes [ ] thyroid problem  Hematologic/Lymphatic: [ ] negative [ ] anemia [ ] bleeding problem  Allergic/Immunologic: [ ] negative [ ] itchy eyes [ ] nasal discharge [ ] hives [ ] angioedema  [x ] All other systems negative  [ ] Unable to assess ROS because ________    OBJECTIVE:  ICU Vital Signs Last 24 Hrs  T(C): 36.5 (21 Dec 2021 06:15), Max: 36.7 (20 Dec 2021 16:55)  T(F): 97.7 (21 Dec 2021 06:15), Max: 98 (20 Dec 2021 16:55)  HR: 81 (21 Dec 2021 06:00) (81 - 92)  BP: 136/66 (21 Dec 2021 06:00) (127/49 - 137/48)  BP(mean): --  ABP: --  ABP(mean): --  RR: 20 (21 Dec 2021 06:00) (18 - 20)  SpO2: 97% (21 Dec 2021 06:00) (96% - 100%)        CAPILLARY BLOOD GLUCOSE      PHYSICAL EXAM:  General: Female sitting up comfortably in bed in no acute distress  HEENT: Normal sclera  Respiratory: Crackles in right upper and right lower lung fields, Left lung clear  Cardiovascular: Regular rate and rhythm, systolic ejection murmur  Abdomen: Nontender nondistended  Extremities: 2+ pitting edema bilaterally  Skin: No rashes  Neurological: No appreciable neurologic deficits  Psychiatry: Appropriate mood and affect    LINES:     HOSPITAL MEDICATIONS:  Standing Meds:  atorvastatin 40 milliGRAM(s) Oral at bedtime  enoxaparin Injectable 40 milliGRAM(s) SubCutaneous at bedtime  influenza  Vaccine (HIGH DOSE) 0.7 milliLiter(s) IntraMuscular once  levothyroxine 75 MICROGram(s) Oral daily  losartan 50 milliGRAM(s) Oral daily      PRN Meds:  acetaminophen     Tablet .. 650 milliGRAM(s) Oral every 6 hours PRN  aluminum hydroxide/magnesium hydroxide/simethicone Suspension 30 milliLiter(s) Oral every 4 hours PRN  benzonatate 100 milliGRAM(s) Oral three times a day PRN  melatonin 3 milliGRAM(s) Oral at bedtime PRN  ondansetron Injectable 4 milliGRAM(s) IV Push every 8 hours PRN      LABS:                        12.3   8.41  )-----------( 264      ( 20 Dec 2021 19:06 )             39.4     Hgb Trend: 12.3<--  12-20    136  |  97<L>  |  5<L>  ----------------------------<  94  4.3   |  31  |  0.66    Ca    9.1      20 Dec 2021 19:06    TPro  8.0  /  Alb  3.4  /  TBili  0.2  /  DBili  x   /  AST  16  /  ALT  12  /  AlkPhos  96  12-20    Creatinine Trend: 0.66<--      MICROBIOLOGY:       RADIOLOGY:  [ ] Reviewed and interpreted by me    PULMONARY FUNCTION TESTS:    EKG:

## 2021-12-21 NOTE — PATIENT PROFILE ADULT - FALL HARM RISK - HARM RISK INTERVENTIONS

## 2021-12-21 NOTE — H&P ADULT - ATTENDING COMMENTS
89 yo female with pmh of lung cancer s/p lobectomy is here for increased SOB and lethargy over the course of several months. Pt has fairly unchanged Ct chest findings except new pleural effusion. in the setting of lower ext swelling we must rule out HF etiology. Pt has consolidations on imaging that can represent PNA, however afebrile and no leukocytosis, much less likely in ddx. Would call Pulm in am. tte, lasix trial. Duplex of legs ordered

## 2021-12-21 NOTE — H&P ADULT - NSHPREVIEWOFSYSTEMS_GEN_ALL_CORE
Constitutional: No fevers, no chills  Eyes: No visual changes  ENMT: No sore throat, no congestion  Resp: + shortness of breath, + productive and dry cough intermittently   Cardio: No chest pain, no palpitations, increased LE peripheral edema, no syncope  GI: No abdominal pain, no nausea, no vomiting, no diarrhea  : No dysuria, no urinary frequency  MSK: +back pain, no neck pain  Skin: dry lower ext skin no lacerations, no bruising  Neuro: No headache, no numbness, no weakness

## 2021-12-21 NOTE — H&P ADULT - NSHPLABSRESULTS_GEN_ALL_CORE
.  LABS:                         12.3   8.41  )-----------( 264      ( 20 Dec 2021 19:06 )             39.4     12-20    136  |  97<L>  |  5<L>  ----------------------------<  94  4.3   |  31  |  0.66    Ca    9.1      20 Dec 2021 19:06    TPro  8.0  /  Alb  3.4  /  TBili  0.2  /  DBili  x   /  AST  16  /  ALT  12  /  AlkPhos  96  12-20        Serum Pro-Brain Natriuretic Peptide: 390 pg/mL (12-20 @ 19:06)        RADIOLOGY, EKG & ADDITIONAL TESTS: Reviewed.     < from: CT Chest No Cont (12.20.21 @ 21:26) >    IMPRESSION:    Similar to mild interval increase in the patchy opacities and areas of   consolidation in the right upper and middle lobes when compared to prior   examination.    Interval increase in size of now small to moderate right pleural effusion.    --- End of Report ---      < end of copied text >

## 2021-12-21 NOTE — PROGRESS NOTE ADULT - SUBJECTIVE AND OBJECTIVE BOX
MD SHAGGY Daley [phone]/[id number]      PROGRESS NOTE:     Patient is a 89y old  Female who presents with a chief complaint of SOB/Lethargy (21 Dec 2021 00:42)      SUBJECTIVE / OVERNIGHT EVENTS:    No acute events overnight.   Patient examined at bedside        MEDICATIONS  (STANDING):  atorvastatin 40 milliGRAM(s) Oral at bedtime  enoxaparin Injectable 40 milliGRAM(s) SubCutaneous at bedtime  influenza  Vaccine (HIGH DOSE) 0.7 milliLiter(s) IntraMuscular once  levothyroxine 75 MICROGram(s) Oral daily  losartan 50 milliGRAM(s) Oral daily    MEDICATIONS  (PRN):  acetaminophen     Tablet .. 650 milliGRAM(s) Oral every 6 hours PRN Temp greater or equal to 38C (100.4F), Mild Pain (1 - 3)  aluminum hydroxide/magnesium hydroxide/simethicone Suspension 30 milliLiter(s) Oral every 4 hours PRN Dyspepsia  benzonatate 100 milliGRAM(s) Oral three times a day PRN Cough  melatonin 3 milliGRAM(s) Oral at bedtime PRN Insomnia  ondansetron Injectable 4 milliGRAM(s) IV Push every 8 hours PRN Nausea and/or Vomiting      CAPILLARY BLOOD GLUCOSE        I&O's Summary      PHYSICAL EXAM:  Vital Signs Last 24 Hrs  T(C): 36.5 (21 Dec 2021 06:15), Max: 36.7 (20 Dec 2021 16:55)  T(F): 97.7 (21 Dec 2021 06:15), Max: 98 (20 Dec 2021 16:55)  HR: 81 (21 Dec 2021 06:00) (81 - 92)  BP: 136/66 (21 Dec 2021 06:00) (127/49 - 137/48)  BP(mean): --  RR: 20 (21 Dec 2021 06:00) (18 - 20)  SpO2: 97% (21 Dec 2021 06:00) (96% - 100%)    CONSTITUTIONAL: NAD; well-developed  HEENT: PERRL, clear conjunctiva  RESPIRATORY: Normal respiratory effort; lungs are clear to auscultation bilaterally; No Crackles/Rhonchi/Wheezing  CARDIOVASCULAR: Regular rate and rhythm, normal S1 and S2, no murmur/rub/gallop; No lower extremity edema; Peripheral pulses are 2+ bilaterally  ABDOMEN: Nontender to palpation, normoactive bowel sounds, no rebound/guarding; No hepatosplenomegaly  MUSCLOSKELETAL: no clubbing or cyanosis of digits; no joint swelling or tenderness to palpation  EXTREMITY: Lower extremities Non-tender to palpation; non-erythematous B/L  Neuro: A&Ox3; no focal deficits   Psych: normal mood; Affect appropirate    LABS:                        12.3   8.41  )-----------( 264      ( 20 Dec 2021 19:06 )             39.4     12-20    136  |  97<L>  |  5<L>  ----------------------------<  94  4.3   |  31  |  0.66    Ca    9.1      20 Dec 2021 19:06    TPro  8.0  /  Alb  3.4  /  TBili  0.2  /  DBili  x   /  AST  16  /  ALT  12  /  AlkPhos  96  12-20                RADIOLOGY & ADDITIONAL TESTS:  Results Reviewed:   Imaging Personally Reviewed:  Electrocardiogram Personally Reviewed:    COORDINATION OF CARE:  Care Discussed with Consultants/Other Providers [Y/N]:  Prior or Outpatient Records Reviewed [Y/N]:   MD SHAGGY Daley [phone]/[facility] [id number]      PROGRESS NOTE:     Patient is a 89y old  Female who presents with a chief complaint of SOB/Lethargy (21 Dec 2021 00:42)      SUBJECTIVE / OVERNIGHT EVENTS:    No acute events overnight.   Patient examined at bedside with SOB and leg swelling to both lower ext. patient saying she has been urinating more after the lasix, not complaining of any pain on RA.         MEDICATIONS  (STANDING):  atorvastatin 40 milliGRAM(s) Oral at bedtime  enoxaparin Injectable 40 milliGRAM(s) SubCutaneous at bedtime  influenza  Vaccine (HIGH DOSE) 0.7 milliLiter(s) IntraMuscular once  levothyroxine 75 MICROGram(s) Oral daily  losartan 50 milliGRAM(s) Oral daily    MEDICATIONS  (PRN):  acetaminophen     Tablet .. 650 milliGRAM(s) Oral every 6 hours PRN Temp greater or equal to 38C (100.4F), Mild Pain (1 - 3)  aluminum hydroxide/magnesium hydroxide/simethicone Suspension 30 milliLiter(s) Oral every 4 hours PRN Dyspepsia  benzonatate 100 milliGRAM(s) Oral three times a day PRN Cough  melatonin 3 milliGRAM(s) Oral at bedtime PRN Insomnia  ondansetron Injectable 4 milliGRAM(s) IV Push every 8 hours PRN Nausea and/or Vomiting      CAPILLARY BLOOD GLUCOSE        I&O's Summary      PHYSICAL EXAM:  Vital Signs Last 24 Hrs  T(C): 36.5 (21 Dec 2021 06:15), Max: 36.7 (20 Dec 2021 16:55)  T(F): 97.7 (21 Dec 2021 06:15), Max: 98 (20 Dec 2021 16:55)  HR: 81 (21 Dec 2021 06:00) (81 - 92)  BP: 136/66 (21 Dec 2021 06:00) (127/49 - 137/48)  BP(mean): --  RR: 20 (21 Dec 2021 06:00) (18 - 20)  SpO2: 97% (21 Dec 2021 06:00) (96% - 100%)    CONSTITUTIONAL: NAD; well-developed  HEENT: PERRL, clear conjunctiva  RESPIRATORY: Normal respiratory effort; lungs are clear to auscultation bilaterally; + Wheezing/Crackles R lower side   CARDIOVASCULAR: Regular rate and rhythm, normal S1,S2, w Mumur; 2+ pitting edema in lower ext from feet up to knees B/L  ABDOMEN: Nontender to palpation, normoactive bowel sounds, no rebound/guarding; No hepatosplenomegaly  MUSCLOSKELETAL: no clubbing or cyanosis of digits; no joint swelling or tenderness to palpation  EXTREMITY: Lower extremities Non-tender to palpation; non-erythematous B/L  Neuro: A&Ox3; no focal deficits   Psych: normal mood; Affect appropirate    LABS:                        12.3   8.41  )-----------( 264      ( 20 Dec 2021 19:06 )             39.4     12-20    136  |  97<L>  |  5<L>  ----------------------------<  94  4.3   |  31  |  0.66    Ca    9.1      20 Dec 2021 19:06    TPro  8.0  /  Alb  3.4  /  TBili  0.2  /  DBili  x   /  AST  16  /  ALT  12  /  AlkPhos  96  12-20                RADIOLOGY & ADDITIONAL TESTS:  Results Reviewed:   Imaging Personally Reviewed:  Electrocardiogram Personally Reviewed:    COORDINATION OF CARE:  Care Discussed with Consultants/Other Providers [Y/N]:  Prior or Outpatient Records Reviewed [Y/N]:

## 2021-12-22 ENCOUNTER — TRANSCRIPTION ENCOUNTER (OUTPATIENT)
Age: 86
End: 2021-12-22

## 2021-12-22 VITALS
OXYGEN SATURATION: 96 % | TEMPERATURE: 98 F | HEART RATE: 87 BPM | SYSTOLIC BLOOD PRESSURE: 138 MMHG | DIASTOLIC BLOOD PRESSURE: 57 MMHG | RESPIRATION RATE: 17 BRPM

## 2021-12-22 LAB
A1C WITH ESTIMATED AVERAGE GLUCOSE RESULT: 6 % — HIGH (ref 4–5.6)
ALBUMIN SERPL ELPH-MCNC: 3.1 G/DL — LOW (ref 3.3–5)
ALP SERPL-CCNC: 85 U/L — SIGNIFICANT CHANGE UP (ref 40–120)
ALT FLD-CCNC: 7 U/L — SIGNIFICANT CHANGE UP (ref 4–33)
ANION GAP SERPL CALC-SCNC: 11 MMOL/L — SIGNIFICANT CHANGE UP (ref 7–14)
AST SERPL-CCNC: 17 U/L — SIGNIFICANT CHANGE UP (ref 4–32)
BASOPHILS # BLD AUTO: 0.03 K/UL — SIGNIFICANT CHANGE UP (ref 0–0.2)
BASOPHILS NFR BLD AUTO: 0.4 % — SIGNIFICANT CHANGE UP (ref 0–2)
BILIRUB SERPL-MCNC: 0.3 MG/DL — SIGNIFICANT CHANGE UP (ref 0.2–1.2)
BUN SERPL-MCNC: 8 MG/DL — SIGNIFICANT CHANGE UP (ref 7–23)
CALCIUM SERPL-MCNC: 8.9 MG/DL — SIGNIFICANT CHANGE UP (ref 8.4–10.5)
CHLORIDE SERPL-SCNC: 97 MMOL/L — LOW (ref 98–107)
CHOLEST SERPL-MCNC: 124 MG/DL — SIGNIFICANT CHANGE UP
CO2 SERPL-SCNC: 29 MMOL/L — SIGNIFICANT CHANGE UP (ref 22–31)
CREAT SERPL-MCNC: 0.61 MG/DL — SIGNIFICANT CHANGE UP (ref 0.5–1.3)
EOSINOPHIL # BLD AUTO: 0.09 K/UL — SIGNIFICANT CHANGE UP (ref 0–0.5)
EOSINOPHIL NFR BLD AUTO: 1.2 % — SIGNIFICANT CHANGE UP (ref 0–6)
ESTIMATED AVERAGE GLUCOSE: 126 — SIGNIFICANT CHANGE UP
GLUCOSE SERPL-MCNC: 93 MG/DL — SIGNIFICANT CHANGE UP (ref 70–99)
HCT VFR BLD CALC: 38.5 % — SIGNIFICANT CHANGE UP (ref 34.5–45)
HDLC SERPL-MCNC: 46 MG/DL — LOW
HGB BLD-MCNC: 12.3 G/DL — SIGNIFICANT CHANGE UP (ref 11.5–15.5)
IANC: 5.14 K/UL — SIGNIFICANT CHANGE UP (ref 1.5–8.5)
IMM GRANULOCYTES NFR BLD AUTO: 0.3 % — SIGNIFICANT CHANGE UP (ref 0–1.5)
LIPID PNL WITH DIRECT LDL SERPL: 60 MG/DL — SIGNIFICANT CHANGE UP
LYMPHOCYTES # BLD AUTO: 1.47 K/UL — SIGNIFICANT CHANGE UP (ref 1–3.3)
LYMPHOCYTES # BLD AUTO: 19.4 % — SIGNIFICANT CHANGE UP (ref 13–44)
MAGNESIUM SERPL-MCNC: 2.1 MG/DL — SIGNIFICANT CHANGE UP (ref 1.6–2.6)
MCHC RBC-ENTMCNC: 28.5 PG — SIGNIFICANT CHANGE UP (ref 27–34)
MCHC RBC-ENTMCNC: 31.9 GM/DL — LOW (ref 32–36)
MCV RBC AUTO: 89.3 FL — SIGNIFICANT CHANGE UP (ref 80–100)
MONOCYTES # BLD AUTO: 0.84 K/UL — SIGNIFICANT CHANGE UP (ref 0–0.9)
MONOCYTES NFR BLD AUTO: 11.1 % — SIGNIFICANT CHANGE UP (ref 2–14)
NEUTROPHILS # BLD AUTO: 5.14 K/UL — SIGNIFICANT CHANGE UP (ref 1.8–7.4)
NEUTROPHILS NFR BLD AUTO: 67.6 % — SIGNIFICANT CHANGE UP (ref 43–77)
NON HDL CHOLESTEROL: 78 MG/DL — SIGNIFICANT CHANGE UP
NRBC # BLD: 0 /100 WBCS — SIGNIFICANT CHANGE UP
NRBC # FLD: 0 K/UL — SIGNIFICANT CHANGE UP
PHOSPHATE SERPL-MCNC: 3.3 MG/DL — SIGNIFICANT CHANGE UP (ref 2.5–4.5)
PLATELET # BLD AUTO: 248 K/UL — SIGNIFICANT CHANGE UP (ref 150–400)
POTASSIUM SERPL-MCNC: 3.8 MMOL/L — SIGNIFICANT CHANGE UP (ref 3.5–5.3)
POTASSIUM SERPL-SCNC: 3.8 MMOL/L — SIGNIFICANT CHANGE UP (ref 3.5–5.3)
PROT SERPL-MCNC: 7.5 G/DL — SIGNIFICANT CHANGE UP (ref 6–8.3)
RBC # BLD: 4.31 M/UL — SIGNIFICANT CHANGE UP (ref 3.8–5.2)
RBC # FLD: 14.6 % — HIGH (ref 10.3–14.5)
SODIUM SERPL-SCNC: 137 MMOL/L — SIGNIFICANT CHANGE UP (ref 135–145)
TRIGL SERPL-MCNC: 88 MG/DL — SIGNIFICANT CHANGE UP
TSH SERPL-MCNC: 0.71 UIU/ML — SIGNIFICANT CHANGE UP (ref 0.27–4.2)
WBC # BLD: 7.59 K/UL — SIGNIFICANT CHANGE UP (ref 3.8–10.5)
WBC # FLD AUTO: 7.59 K/UL — SIGNIFICANT CHANGE UP (ref 3.8–10.5)

## 2021-12-22 PROCEDURE — 99239 HOSP IP/OBS DSCHRG MGMT >30: CPT

## 2021-12-22 PROCEDURE — 99233 SBSQ HOSP IP/OBS HIGH 50: CPT

## 2021-12-22 RX ORDER — FUROSEMIDE 40 MG
20 TABLET ORAL ONCE
Refills: 0 | Status: COMPLETED | OUTPATIENT
Start: 2021-12-22 | End: 2021-12-22

## 2021-12-22 RX ORDER — FUROSEMIDE 40 MG
1 TABLET ORAL
Qty: 14 | Refills: 0
Start: 2021-12-22 | End: 2022-01-04

## 2021-12-22 RX ORDER — FUROSEMIDE 40 MG
20 TABLET ORAL ONCE
Refills: 0 | Status: DISCONTINUED | OUTPATIENT
Start: 2021-12-22 | End: 2021-12-22

## 2021-12-22 RX ADMIN — Medication 20 MILLIGRAM(S): at 10:33

## 2021-12-22 RX ADMIN — LOSARTAN POTASSIUM 50 MILLIGRAM(S): 100 TABLET, FILM COATED ORAL at 05:36

## 2021-12-22 RX ADMIN — Medication 75 MICROGRAM(S): at 05:37

## 2021-12-22 RX ADMIN — Medication 100 MILLIGRAM(S): at 10:38

## 2021-12-22 NOTE — DISCHARGE NOTE NURSING/CASE MANAGEMENT/SOCIAL WORK - NSSCTYPOFSERV_GEN_ALL_CORE
RN/PT services. Nurse to vsit within 24-48 hours of discharge. Nurse to call prior to visit to arrange. Physical therapy to follow.

## 2021-12-22 NOTE — PHARMACOTHERAPY INTERVENTION NOTE - COMMENTS
Educated patient on medication indications, how and when to take the medications, and side effects.    Edith Callejas, PharmD  PGY-1 Pharmacy Resident  Spectra: [phone]

## 2021-12-22 NOTE — DISCHARGE NOTE PROVIDER - PROVIDER TOKENS
PROVIDER:[TOKEN:[[id number]]] PROVIDER:[TOKEN:[[id number]]],PROVIDER:[TOKEN:[[id number]]]

## 2021-12-22 NOTE — PROGRESS NOTE ADULT - ASSESSMENT
88YO Female PMH Lung adenocarcinoma s/p RLL lobectomy (declined chemo/radiation) w/ pleural effusion and CT scans showing RML and RUL consolidation, HTN, HLD, and hypothyroidism who presented 12/20 with several month hx of worsening dyspnea, cough, fatigue and swelling of her lower extremities.   Pulmonary consulted for progressive shortness of breath in the setting of lung adenocarcinoma.    Recs:  - RUL and RML consolidations previous noted  - Pleural effusion small to moderate in size but also noted to be present 6/2021  - Right Pleural effusion ultrasounded at bedside yesterday 12/21 with window being too small to safely access for thoracentesis  - Effusion size makes it unlikely that it is causing her SOB  - Highly suspect that pt is volume overloaded and that source of dyspnea is Heart Failure related  - Please obtain Echo  - Please start diuresis with Lasix 20mg IV   - Strict In and Out  - Diurese net negative 500-1L  - F/u TSH in the setting of hypothyroidism      Devon Mallory PGY-5  Pulmonary/Critical Care Fellow  Pager: [phone] (KEMAL), [phone] (NS)  Pulmonary Spectra #[phone] (NS) / [phone] (KEMAL)       88YO Female PMH Lung adenocarcinoma s/p RLL lobectomy (declined chemo/radiation) w/ pleural effusion and CT scans showing RML and RUL consolidation, HTN, HLD, and hypothyroidism who presented 12/20 with several month hx of worsening dyspnea, cough, fatigue and swelling of her lower extremities.   Pulmonary consulted for progressive shortness of breath in the setting of lung adenocarcinoma.    Recs:  - RUL and RML consolidations previous noted  - Pleural effusion small to moderate in size but also noted to be present 6/2021  - Right Pleural effusion ultrasounded at bedside yesterday 12/21 with window being too small to safely access for thoracentesis  - Effusion size makes it unlikely that it is causing her SOB  - Highly suspect that pt is volume overloaded and that source of dyspnea is Heart Failure related  - Echo showing probable severe AS  - Would assure cardiology follow up for severe AS  - Please start diuresis with Lasix 20mg IV   - Strict In and Out and daily weights  - Diurese net negative 500-1L  - F/u TSH in the setting of hypothyroidism    Case discussed with Dr. Matt Mallory PGY-5  Pulmonary/Critical Care Fellow  Pager: [phone] (KEMAL), [phone] (NS)  Pulmonary Spectra #[id number] (NS) / [phone] (KEMAL)

## 2021-12-22 NOTE — PHYSICAL THERAPY INITIAL EVALUATION ADULT - DIAGNOSIS, PT EVAL
Pt admitted for SOB 2/2 PNA; pt presents with decreased strength, decreased balance, and decreased aerobic capacity/endurance.

## 2021-12-22 NOTE — PHYSICAL THERAPY INITIAL EVALUATION ADULT - BALANCE DISTURBANCE, SYSTEM IMPAIRMENT CONTRIBUTE, REHAB EVAL
Problem: Self Care Deficits Care Plan (Adult)  Goal: Interventions      JOINT CAMP OCCUPATIONAL THERAPY GUMARO: Daily Note, Discharge and AM 6/7/2018  INPATIENT: Hospital Day: 2  [place] / [place] / Product Type: Medicare /      NAME/AGE/GENDER: Selene Clifford is a 68 y.o. female   PRIMARY DIAGNOSIS:  Primary localized osteoarthrosis of right hip [M16.11]   Procedure(s) and Anesthesia Type:     * HIP ARTHROPLASTY TOTAL/ RIGHT/  - Spinal (Right)  ICD-10: Treatment Diagnosis:    · Pain in Right Hip (M25.551)  · Stiffness of Right Hip, Not elsewhere classified (M25.651)  · Generalized Muscle Weakness (M62.81)  · Other lack of cordination (R27.8)      ASSESSMENT:     Ms. Nora Guadarrama is s/p R GUMARO and presents with decreased weight bearing on R LE and decreased independence with functional mobility and activities of daily living. Patient completed shower and dressing as charter below in ADL grid and is ambulating with rolling walker and contact guard assist.  Patient has met 5/5 goals and plans to return home with good family support. Family able to provide patient with appropriate level of assistance at this time. OT reviewed hip precautions throughout session and issued long handled sponge for home use. Patient instructed to call for assistance when needing to get up from recliner and all needs in reach. Patient verbalized understanding of call light. This section established at most recent assessment   PROBLEM LIST (Impairments causing functional limitations):  1. Decreased Strength  2. Decreased ADL/Functional Activities  3. Decreased Transfer Abilities  4. Increased Pain  5. Increased Fatigue  6. Decreased Flexibility/Joint Mobility  7. Decreased Knowledge of Precautions   INTERVENTIONS PLANNED: (Benefits and precautions of occupational therapy have been discussed with the patient.)  1. Activities of daily living training  2. Adaptive equipment training  3.  Balance training  4. Clothing management  5. Donning&doffing training  6. Theraputic activity     TREATMENT PLAN: Frequency/Duration: Follow patient 1x to address above goals. Rehabilitation Potential For Stated Goals: Excellent     RECOMMENDED REHABILITATION/EQUIPMENT: (at time of discharge pending progress): Continue Skilled Therapy and Home Health: Physical Therapy. OCCUPATIONAL PROFILE AND HISTORY:   History of Present Injury/Illness (Reason for Referral): Pt presents this date s/p (right) GUMARO. Past Medical History/Comorbidities:   Ms. Kelly Wagoner  has a past medical history of Cancer [facility]); CHF (congestive heart failure) ([facility] [address] Chronic diastolic heart failure ([facility] [address] Chronic obstructive pulmonary disease ([facility] [address] Depression; GERD (gastroesophageal reflux disease); History of breast cancer; Hypothyroidism, postsurgical; Pacemaker; Paroxysmal atrial fibrillation ([facility] [address] Primary osteoarthritis of right knee; Rosacea; Scoliosis/kyphoscoliosis; and Status post right hip replacement (6/6/2018). She also has no past medical history of Adverse effect of anesthesia; Difficult intubation; Malignant hyperthermia due to anesthesia; Nausea & vomiting; or Pseudocholinesterase deficiency. Ms. Kelly Wagoner  has a past surgical history that includes hx pacemaker; hx breast lumpectomy (Left); hx thyroidectomy; and hx heent.   Social History/Living Environment:   Home Environment: Private residence  # Steps to Enter: 2  One/Two Story Residence: One story  Living Alone: No  Support Systems: Child(amber)  Patient Expects to be Discharged to[de-identified] Private residence  Current DME Used/Available at Home: Walker, rolling, Jeffrey Charter, straight  Tub or Shower Type: Tub/Shower combination  Prior Level of Function/Work/Activity:  Modified Indep with self care and functional mobility     Number of Personal Factors/Comorbidities that affect the Plan of Care: Brief history (0):  LOW COMPLEXITY   ASSESSMENT OF OCCUPATIONAL PERFORMANCE[de-identified]   Most Recent Physical Functioning:   Balance  Sitting: Intact  Standing: With support                              Mental Status  Neurologic State: Alert  Orientation Level: Oriented X4  Cognition: Follows commands  Perception: Appears intact  Perseveration: No perseveration noted  Safety/Judgement: Awareness of environment; Fall prevention                Basic ADLs (From Assessment) Complex ADLs (From Assessment)   Basic ADL  Feeding: Supervision  Oral Facial Hygiene/Grooming: Supervision  Bathing: Moderate assistance  Type of Bath: Chlorhexidine (CHG), Full, Shower  Upper Body Dressing: Minimum assistance  Lower Body Dressing: Moderate assistance  Toileting: Total assistance     Grooming/Bathing/Dressing Activities of Daily Living   Grooming  Grooming Assistance: Supervision/set up  Brushing/Combing Hair: Supervision/set-up Cognitive Retraining  Safety/Judgement: Awareness of environment; Fall prevention   Upper Body Bathing  Bathing Assistance: Supervision/set-up  Position Performed: Seated in chair  Cues: Verbal cues provided  Adaptive Equipment: Grab bar;Long handled sponge; Shower chair Feeding  Feeding Assistance: Supervision/set-up   Lower Body Bathing  Bathing Assistance: Supervision/set-up  Perineal  : Supervision/set-up  Position Performed: Seated in chair  Cues: Verbal cues provided  Adaptive Equipment: Grab bar;Long handled sponge  Lower Body : Supervision/set-up  Position Performed: Seated in chair  Cues: Verbal cues provided  Adaptive Equipment: Grab bar;Long handled sponge; Shower chair Toileting  Toileting Assistance: Supervision/set up   Upper Body Dressing Assistance  Dressing Assistance: Supervision/set-up  Bra: Supervision/set-up  Pullover Shirt: Supervision/set-up Functional Transfers  Bathroom Mobility: Contact guard assistance  Toilet Transfer : Contact guard assistance  Shower Transfer: Contact guard assistance  Cues: Verbal cues provided  Adaptive Equipment: Grab bars; Shower chair with back; Linda Dent (comment)   Lower Body Dressing Assistance  Dressing Assistance: Minimum assistance  Underpants: Minimum assistance  Pants With Elastic Waist: Minimum assistance  Slip on Shoes Without Back: Supervision/set-up  Position Performed: Standing  Adaptive Equipment Used: Grab bar;Long handled shoe horn;Reacher;Walker Bed/Mat Mobility  Sit to Supine: Contact guard assistance  Bed to Chair: Contact guard assistance  Scooting: Contact guard assistance         Physical Skills Involved:  1. Range of Motion  2. Balance  3. Strength Cognitive Skills Affected (resulting in the inability to perform in a timely and safe manner):  1. None Psychosocial Skills Affected:  1. None   Number of elements that affect the Plan of Care: 1-3:  LOW COMPLEXITY   CLINICAL DECISION MAKIN[address] [place] [address] AM-PAC 6 Clicks   Daily Activity Inpatient Short Form  How much help from another person does the patient currently need. .. Total A Lot A Little None   1. Putting on and taking off regular lower body clothing? [] 1   [] 2   [x] 3   [] 4   2. Bathing (including washing, rinsing, drying)? [] 1   [] 2   [x] 3   [] 4   3. Toileting, which includes using toilet, bedpan or urinal?   [] 1   [] 2   [] 3   [x] 4   4. Putting on and taking off regular upper body clothing? [] 1   [] 2   [] 3   [x] 4   5. Taking care of personal grooming such as brushing teeth? [] 1   [] 2   [] 3   [x] 4   6. Eating meals? [] 1   [] 2   [] 3   [x] 4   © , Trustees of [address] [place] [address], under license to [employer]. All rights reserved     Score:  Initial: 16 Most Recent: 22 (Date: 18 )    Interpretation of Tool:  Represents activities that are increasingly more difficult (i.e. Bed mobility, Transfers, Gait). Score 24 23 22-20 19-15 14-10 9-7 6     Modifier CH CI CJ CK CL CM CN      ?  Self Care:     - CURRENT STATUS: CK - 40%-59% impaired, limited or restricted    - GOAL STATUS: CJ - 20%-39% impaired, limited or restricted    - D/C STATUS:  CJ - 20%-39% impaired, limited or restricted  [place] / [place] / Product Type: Medicare /      Medical Necessity:     · Patient is expected to demonstrate progress in balance, coordination and functional technique to decrease assistance required with self care and functional mobility. Reason for Services/Other Comments:  · Patient continues to require skilled intervention due to decreased self care and functional mobility. Use of outcome tool(s) and clinical judgement create a POC that gives a: LOW COMPLEXITY            TREATMENT:   (In addition to Assessment/Re-Assessment sessions the following treatments were rendered)     Pre-treatment Symptoms/Complaints: Tolerated shower  Pain: Initial:   Pain Intensity 1: 5  Pain Location 1: Hip  Pain Orientation 1: Right  Pain Intervention(s) 1: Nurse notified, Repositioned  Post Session:  5     Self Care: (40): Procedure(s) (per grid) utilized to improve and/or restore self-care/home management as related to dressing, bathing, toileting and grooming. Required minimal verbal and   cueing to facilitate activities of daily living skills and compensatory activities. Treatment/Session Assessment:     Response to Treatment:  Tolerated well. Education:  [] Home Exercises  [x] Fall Precautions  [x] Hip Precautions [] Going Home Video  [] Knee/Hip Prosthesis Review  [x] Walker Management/Safety [x] Adaptive Equipment as Needed       Interdisciplinary Collaboration:   o Occupational Therapist  o Registered Nurse    After treatment position/precautions:   o Up in chair  o Bed/Chair-wheels locked  o Caregiver at bedside  o Call light within reach  o RN notified     Compliance with Program/Exercises: compliant all of the time. Recommendations: Pt doing well all goals met and will do well at home with support from family. Patient will be discharged home with home health PT.  No further Occupational Therapy warranted, will discharge Occupational Therapy services.        Total Treatment Duration:  OT Patient Time In/Time Out  Time In: 0645  Time Out: Ginger 83, OT musculoskeletal

## 2021-12-22 NOTE — PROGRESS NOTE ADULT - PROBLEM SELECTOR PLAN 4
-cont home med statin 10 mg (pt was not sure if this was lipitor and outpt records do show crestor 10 was last picked up on 9/2021)  -confirm with daughter in the AM
-cont home med statin 10 mg (pt was not sure if this was lipitor and outpt records do show crestor 10 was last picked up on 9/2021)  -confirm with daughter in the AM

## 2021-12-22 NOTE — DISCHARGE NOTE PROVIDER - HOSPITAL COURSE
89 y/o female with pmh of lung cancer s/p lobectomy (no chemo or radiation), htn, hld, and hypothyroid presents with several month hx of worsening dyspnea, cough, fatigue and swelling of her lower extremities. She was sent her by her pulmonologist Dr. Gonzalo Mendoza who said her cont'd cough needs to be evaluated in the ED. Pt denies recent illnesses or fevers or sick contacts.     Hospital Course   Patient had significant edema on her lower extremities. She had small-moderate pleural effusion on right side similar to previous imaging studies, in addition to consolidations which she also had in previous imaging. Pulmology did not recommend paracentesis since the pocket was too small. The patient was diuresed with IV Lasix , strict I/O. TTE showed EF 64% with Severe Aortic Stenosis. Patient is hemodynamically stable for discharge with PO lasix and outpatient cardiology follow-up with Dr. Huynh. 89 y/o female with pmh of lung cancer s/p lobectomy (no chemo or radiation), htn, hld, and hypothyroid presents with several month hx of worsening dyspnea, cough, fatigue and swelling of her lower extremities. She was sent her by her pulmonologist Dr. Gonzalo Mendoza who said her cont'd cough needs to be evaluated in the ED. Pt denies recent illnesses or fevers or sick contacts.     Hospital Course   Patient had significant edema on her lower extremities. She had small-moderate pleural effusion on right side similar to previous imaging studies, in addition to consolidations which she also had in previous imaging. Pulmology did not recommend paracentesis since the pocket was too small. The patient was diuresed with IV Lasix , strict I/O. TTE showed EF 64% with Severe Aortic Stenosis. Patient is hemodynamically stable for discharge with PO lasix and outpatient cardiology follow-up with Dr. Huynh.    Physical therapy was not able to fully assess PT for stairs extensive discussion with patient and patient's family who wish for patient to have Home PT and understand risks of incomplete physical therapy evaluation. Patient has assistance at home by nearby family members and wishes to follow-up outpatient.

## 2021-12-22 NOTE — PROGRESS NOTE ADULT - PROBLEM SELECTOR PLAN 2
New leg swelling B/L for the past few months with pain to palpation  No soft tissue indication for infection , low concern for cellulitis   - Lower ext no DVT   - gentle diuresis as above  - possible Component of venous stasis
New leg swelling B/L for the past few months with pain to palpation  No soft tissue indication for infection , low concern for cellulitis   - Lower ext no DVT   - gentle diuresis as above  - possible Component of venous stasis

## 2021-12-22 NOTE — DISCHARGE NOTE PROVIDER - NSDCFUADDAPPT_GEN_ALL_CORE_FT
- History


This is a 3085gm AGA infant born at 37 4/7 weeks to a 28 year old  mom with 

prenatal care with Dr. Collins. Pregnancy was complicated by insulin dependent 

gestational diabetes.  Medications taken during pregnancy include:PNV and 

insulin.  She presented to the hospital for scheduled, repeat .  

Infant was delivered via  with AROM  at delivery with clear fluid.  

Infant was vigorous at delivery, taken to the warmer and started on blow by at 

2 minutes of life for saturations less than age targeted values. Brought to 

nursery and blow by continued. I was asked to assess the patient when room air 

trial failed at ~20 minutes of life. Continued to have saturations high 70s-

low 80s on room air despite suctioning, pulse ox repositioning, prone 

positioning. Transferred to neonatology service and then NICU for  

respiratory distress.  


Maternal labs (per maternal nursing history):


Blood type B+  Hep B negative    RPR NR  HIV  negative      Rubella immune   








- Admission Vital Signs


 











Temp Pulse Resp Pulse Ox


 


 99 F   149   38   93 


 


 19 14:00  19 14:00  19 14:00  19 14:00














- Admission Physical Exam


Admit Measurements: 


 Admit Measurements











Length                         48.26 cm


 


[place] Head Circumference     34














HEENT: AFOSF, palate intact, ears appropriately positioned, red reflex 

bilaterally


CV: RRR, no murmur, 2+ femoral pulses, good perfusion


Chest: CTAB, intermittent prolonged pauses in breathing, mild retractions, 

intermittent tachypnea


Abd: soft, non-distended, no organomegaly, 3 vessel cord


:normal male genitalia with testes descended bilaterally, patent appearing 

anus


Ext: moving all extremities well, clavicles intact, no hip clicks/clunks.  Back 

straight without defects.


Neuro: appropriate tone for age, reflexes intact


Skin: pink, warm and dry








- Discharge Physical Exam


 Discharge Measurements











Weight                         3.385 kg


 


Length                         51 cm


 


[place] Head Circumference     34














Physical Exam:





HEENT: AF soft and flat, ears in appropriate position with bilateral small 

preauricular pits


Chest: Clear with good air movement bilaterally


CV: RRR, no murmur, good perfusion


Abd: Soft, no masses or distension, good bowel sounds


: normal male with testes descended


Ext: moving all well hips stable


Skin: warm and dry








- Diagnoses


Patient Problems: 


 Problem List











Problem Status Onset


 


Term  delivered by , current hospitalization Acute 


 


Feeding difficulties in  Resolved 


 


Infant of mother with gestational diabetes Resolved 


 


Respiratory distress of  Resolved 


 


Respiratory failure in  Resolved 


 


Respiratory insufficiency syndrome of  Resolved 














- Hospital Course





This is a 37 4/7 week infant who requires NICU intensive care for:





Resp: Respiratory distress, he was admitted on HFNC 4L and 25%. He weaned to 21

% the night of , to 2L on . We tried weaning to 1 lpm on  but 

his saturations were in the low 90s so we went back to 2 lpm and his sats were >

94. To low flow cannula on  and room air on , back on NC am of  for 

saturations 86-92 while sleeping. To room air on , did well for 24 hours 

with review of monitor sats showing no values <90% and greater than 95% while 

awake.





CV: Normal exam, good BP and perfusion.  





FEN/GI: Admitted on D10W @ 65 mL/kg/d, initial glucose 52. We started OG feeds 

of EBM or term formula on admission, decreased IVF as tolerated, stopped IV 

fluid the night of , increased feeds without difficulty; we started ad clive 

feeds on . At the time of discharge was feeding well, above birthweight 

with appropriate urine and stool. 





Heme: Mother and baby both B+. Bili at 36 hours of life was 8.1/0.3, repeat on 

 was 11.8, low intermediate zone.





ID: Scheduled, unlabored . Sepsis evaluation not indicated.





Discharge planning: NBS #1 sent , NBS #2 was done , HBV was given 

, CCHD screen passed, and hearing screen passed bilaterally before discharge. 

Mother requested circumcision, consent obtained, 1.2 plastibell on . Has 

follow up scheduled with [facility] on 19. Please follow up with Dr. Huynh after you are discharged from the hospital, the information is provided in your discharge summary.   Please call: [phone] to make an appointment     Please follow up with your primary care provider after you are discharged from the hospital for continued medical care.  Please follow up with Dr. Huynh after you are discharged from the hospital, the information is provided in your discharge summary.   Please call: [phone] to make an appointment     Please follow up with your primary care provider after you are discharged from the hospital for continued medical care.     Please follow-up with your pulmonologist Dr. Gonzalo Mendoza after you are discharged from the hospital for continued medical care.

## 2021-12-22 NOTE — DISCHARGE NOTE PROVIDER - NSDCCPTREATMENT_GEN_ALL_CORE_FT
PRINCIPAL PROCEDURE  Procedure: Transthoracic echo  Findings and Treatment: CONCLUSIONS:  1. Mitral annular calcification, otherwise normal mitral  valve. Minimal mitral regurgitation.  2. Aortic valve leaflet morphology not well visualized.  The valve is heavily calcified. Peak transaortic valve  gradient equals 33 mm Hg, mean transaortic valve gradient  equals 19 mm Hg.  Despite the transaortic gradients, the  gross appearance of the valve is consistent with  significant (possibly severe) aortic stenosis.  However,  unable to accurately estimate the aortic valve area. Mild  aortic regurgitation.  3. Normal left ventricular internal dimensions and wall  thicknesses.  4. Endocardium not well visualized; grossly normal left  ventricular systolic function.  5. Right ventricular enlargement with normal right  ventricular systolic function.  6. Right pleural effusion.  Consider TEEfor further evaluation of the aortic valve, if  clinically indicated.

## 2021-12-22 NOTE — PHYSICAL THERAPY INITIAL EVALUATION ADULT - PERTINENT HX OF CURRENT PROBLEM, REHAB EVAL
88 yo female with pmh of lung cancer s/p lobectomy is here for increased SOB and lethargy over the course of several months. Pt has fairly unchanged Ct chest findings except new pleural effusion. in the setting of lower ext swelling we must rule out HF etiology. Pt has consolidations on imaging that can represent PNA, however afebrile and no leukocytosis, much less likely in ddx 89 yo female with pmh of lung cancer s/p lobectomy is here for increased SOB and lethargy over the course of several months. Pt has fairly unchanged Ct chest findings except new pleural effusion. in the setting of lower extremities swelling we must rule out HF etiology. Pt has consolidations on imaging that can represent PNA, however afebrile and no leukocytosis, much less likely in ddx

## 2021-12-22 NOTE — DISCHARGE NOTE PROVIDER - NSDCCPCAREPLAN_GEN_ALL_CORE_FT
PRINCIPAL DISCHARGE DIAGNOSIS  Diagnosis: Aortic stenosis  Assessment and Plan of Treatment: Aortic stenosis is a condition that makes your aortic valve become narrow and stiff. The narrow, stiff valve causes your heart to work harder to pump blood into the aorta.  How can I manage aortic stenosis?   •Limit activities. Your healthcare provider may have you limit strenuous activity. Strenuous activity will make your heart work too hard. Ask your healthcare provider what activities are safe for you to do.  •Take your medicines as directed. You may need medicines to lower your blood pressure. You may also need medicine to help your heart's rhythm. Your healthcare provider will prescribe the medicine that is right for you.  •Eat heart-healthy foods. Heart-healthy foods include salmon, tuna, walnuts, whole-grain breads, low-fat dairy products, beans, and oils such as olive or canola oil. A dietitian or your provider can give you more information on meal plans such as the DASH (Dietary Approaches to Stop Hypertension) eating plan. The DASH plan is low in sodium, processed sugar, unhealthy fats, and total fat. It is high in potassium, calcium, and fiber. These can be found in vegetables, fruit, and whole-grain foods.  We had a discussion with you and your family about your aortic stenosis, and we decided together that you can follow-up with Dr. Anjelica Huynh Cardiologist in clinic for further evaluation of your aortic stenosis. Please make sure you follow up this appointment.   It is likely that your leg swelling was due because of your Aortic Stenosis, as well as the increased shortness of breath.   Please follow up with your primary care doctor soon after you are discharged from the hospital.

## 2021-12-22 NOTE — DISCHARGE NOTE NURSING/CASE MANAGEMENT/SOCIAL WORK - NSDCFUADDAPPT_GEN_ALL_CORE_FT
Please follow up with Dr. Huynh after you are discharged from the hospital, the information is provided in your discharge summary.   Please call: [phone] to make an appointment     Please follow up with your primary care provider after you are discharged from the hospital for continued medical care.

## 2021-12-22 NOTE — DISCHARGE NOTE PROVIDER - NSDCHHNEEDSERVICE_GEN_ALL_CORE
Medication teaching and assessment Medication teaching and assessment/Observation and assessment/Rehabilitation services

## 2021-12-22 NOTE — PROGRESS NOTE ADULT - SUBJECTIVE AND OBJECTIVE BOX
CHIEF COMPLAINT:    Interval Events:    REVIEW OF SYSTEMS:  Constitutional: [ ] negative [ ] fevers [ ] chills [ ] weight loss [ ] weight gain  HEENT: [ ] negative [ ] dry eyes [ ] eye irritation [ ] postnasal drip [ ] nasal congestion  CV: [ ] negative  [ ] chest pain [ ] orthopnea [ ] palpitations [ ] murmur  Resp: [ ] negative [ ] cough [ ] shortness of breath [ ] dyspnea [ ] wheezing [ ] sputum [ ] hemoptysis  GI: [ ] negative [ ] nausea [ ] vomiting [ ] diarrhea [ ] constipation [ ] abd pain [ ] dysphagia   : [ ] negative [ ] dysuria [ ] nocturia [ ] hematuria [ ] increased urinary frequency  Musculoskeletal: [ ] negative [ ] back pain [ ] myalgias [ ] arthralgias [ ] fracture  Skin: [ ] negative [ ] rash [ ] itch  Neurological: [ ] negative [ ] headache [ ] dizziness [ ] syncope [ ] weakness [ ] numbness  Psychiatric: [ ] negative [ ] anxiety [ ] depression  Endocrine: [ ] negative [ ] diabetes [ ] thyroid problem  Hematologic/Lymphatic: [ ] negative [ ] anemia [ ] bleeding problem  Allergic/Immunologic: [ ] negative [ ] itchy eyes [ ] nasal discharge [ ] hives [ ] angioedema  [ ] All other systems negative  [ ] Unable to assess ROS because ________    OBJECTIVE:  ICU Vital Signs Last 24 Hrs  T(C): 37.1 (22 Dec 2021 05:33), Max: 37.1 (22 Dec 2021 05:33)  T(F): 98.7 (22 Dec 2021 05:33), Max: 98.7 (22 Dec 2021 05:33)  HR: 85 (22 Dec 2021 05:33) (81 - 85)  BP: 121/58 (22 Dec 2021 05:33) (121/58 - 141/54)  BP(mean): --  ABP: --  ABP(mean): --  RR: 18 (22 Dec 2021 05:33) (18 - 19)  SpO2: 95% (22 Dec 2021 05:33) (94% - 100%)        12-21 @ 07:01  -  12-22 @ 07:00  --------------------------------------------------------  IN: 450 mL / OUT: 500 mL / NET: -50 mL      CAPILLARY BLOOD GLUCOSE          PHYSICAL EXAM:  General:   HEENT:   Lymph Nodes:  Neck:   Respiratory:   Cardiovascular:   Abdomen:   Extremities:   Skin:   Neurological:  Psychiatry:    HOSPITAL MEDICATIONS:  MEDICATIONS  (STANDING):  atorvastatin 40 milliGRAM(s) Oral at bedtime  enoxaparin Injectable 40 milliGRAM(s) SubCutaneous at bedtime  influenza  Vaccine (HIGH DOSE) 0.7 milliLiter(s) IntraMuscular once  levothyroxine 75 MICROGram(s) Oral daily  losartan 50 milliGRAM(s) Oral daily    MEDICATIONS  (PRN):  acetaminophen     Tablet .. 650 milliGRAM(s) Oral every 6 hours PRN Temp greater or equal to 38C (100.4F), Mild Pain (1 - 3)  aluminum hydroxide/magnesium hydroxide/simethicone Suspension 30 milliLiter(s) Oral every 4 hours PRN Dyspepsia  benzonatate 100 milliGRAM(s) Oral three times a day PRN Cough  melatonin 3 milliGRAM(s) Oral at bedtime PRN Insomnia  ondansetron Injectable 4 milliGRAM(s) IV Push every 8 hours PRN Nausea and/or Vomiting      LABS:                        12.3   8.41  )-----------( 264      ( 20 Dec 2021 19:06 )             39.4     Hgb Trend: 12.3<--  12-20    136  |  97<L>  |  5<L>  ----------------------------<  94  4.3   |  31  |  0.66    Ca    9.1      20 Dec 2021 19:06    TPro  8.0  /  Alb  3.4  /  TBili  0.2  /  DBili  x   /  AST  16  /  ALT  12  /  AlkPhos  96  12-20    Creatinine Trend: 0.66<--            MICROBIOLOGY:       RADIOLOGY:  [ ] Reviewed and interpreted by me    PULMONARY FUNCTION TESTS:    EKG: CHIEF COMPLAINT: Shortness of breath    Interval Events: Pt did not receive lasix last night and will get it today. Echo returned showing probable severe AS    REVIEW OF SYSTEMS:  Constitutional: [ ] negative [ ] fevers [ ] chills [ ] weight loss [ ] weight gain  HEENT: [ ] negative [ ] dry eyes [ ] eye irritation [ ] postnasal drip [ ] nasal congestion  CV: [ ] negative  [ ] chest pain [ ] orthopnea [ ] palpitations [ ] murmur  Resp: [ ] negative [ ] cough [x ] shortness of breath [ ] dyspnea [ ] wheezing [ ] sputum [ ] hemoptysis  GI: [ ] negative [ ] nausea [ ] vomiting [ ] diarrhea [ ] constipation [ ] abd pain [ ] dysphagia   : [ ] negative [ ] dysuria [ ] nocturia [ ] hematuria [ ] increased urinary frequency  Musculoskeletal: [ ] negative [ ] back pain [ ] myalgias [ ] arthralgias [ ] fracture  Skin: [ ] negative [ ] rash [ ] itch  Neurological: [ ] negative [ ] headache [ ] dizziness [ ] syncope [ ] weakness [ ] numbness  Psychiatric: [ ] negative [ ] anxiety [ ] depression  Endocrine: [ ] negative [ ] diabetes [ ] thyroid problem  Hematologic/Lymphatic: [ ] negative [ ] anemia [ ] bleeding problem  Allergic/Immunologic: [ ] negative [ ] itchy eyes [ ] nasal discharge [ ] hives [ ] angioedema  [ x] All other systems negative  [ ] Unable to assess ROS because ________    OBJECTIVE:  ICU Vital Signs Last 24 Hrs  T(C): 37.1 (22 Dec 2021 05:33), Max: 37.1 (22 Dec 2021 05:33)  T(F): 98.7 (22 Dec 2021 05:33), Max: 98.7 (22 Dec 2021 05:33)  HR: 85 (22 Dec 2021 05:33) (81 - 85)  BP: 121/58 (22 Dec 2021 05:33) (121/58 - 141/54)  BP(mean): --  ABP: --  ABP(mean): --  RR: 18 (22 Dec 2021 05:33) (18 - 19)  SpO2: 95% (22 Dec 2021 05:33) (94% - 100%)        12-21 @ 07:01  -  12-22 @ 07:00  --------------------------------------------------------  IN: 450 mL / OUT: 500 mL / NET: -50 mL      CAPILLARY BLOOD GLUCOSE          PHYSICAL EXAM:  General: Female sitting up comfortably in chair in no acute distress  HEENT: Normal sclera  Respiratory: CTA bilaterally  Cardiovascular: Regular rate and rhythm, systolic ejection murmur  Abdomen: Nontender nondistended  Extremities: 2+ pitting edema bilaterally  Skin: No rashes  Neurological: No appreciable neurologic deficits  Psychiatry: Appropriate mood and affect      HOSPITAL MEDICATIONS:  MEDICATIONS  (STANDING):  atorvastatin 40 milliGRAM(s) Oral at bedtime  enoxaparin Injectable 40 milliGRAM(s) SubCutaneous at bedtime  influenza  Vaccine (HIGH DOSE) 0.7 milliLiter(s) IntraMuscular once  levothyroxine 75 MICROGram(s) Oral daily  losartan 50 milliGRAM(s) Oral daily    MEDICATIONS  (PRN):  acetaminophen     Tablet .. 650 milliGRAM(s) Oral every 6 hours PRN Temp greater or equal to 38C (100.4F), Mild Pain (1 - 3)  aluminum hydroxide/magnesium hydroxide/simethicone Suspension 30 milliLiter(s) Oral every 4 hours PRN Dyspepsia  benzonatate 100 milliGRAM(s) Oral three times a day PRN Cough  melatonin 3 milliGRAM(s) Oral at bedtime PRN Insomnia  ondansetron Injectable 4 milliGRAM(s) IV Push every 8 hours PRN Nausea and/or Vomiting      LABS:                        12.3   8.41  )-----------( 264      ( 20 Dec 2021 19:06 )             39.4     Hgb Trend: 12.3<--  12-20    136  |  97<L>  |  5<L>  ----------------------------<  94  4.3   |  31  |  0.66    Ca    9.1      20 Dec 2021 19:06    TPro  8.0  /  Alb  3.4  /  TBili  0.2  /  DBili  x   /  AST  16  /  ALT  12  /  AlkPhos  96  12-20    Creatinine Trend: 0.66<--            MICROBIOLOGY:       RADIOLOGY:  [ ] Reviewed and interpreted by me    PULMONARY FUNCTION TESTS:    EKG:

## 2021-12-22 NOTE — DISCHARGE NOTE NURSING/CASE MANAGEMENT/SOCIAL WORK - PATIENT PORTAL LINK FT
You can access the FollowMyHealth Patient Portal offered by [facility] by registering at the following website: [email or web address]. By joining [facility]’s FollowMyHealth portal, you will also be able to view your health information using other applications (apps) compatible with our system.

## 2021-12-22 NOTE — DISCHARGE NOTE PROVIDER - CARE PROVIDERS DIRECT ADDRESSES
[email or web address]

## 2021-12-22 NOTE — DISCHARGE NOTE PROVIDER - NSDCMRMEDTOKEN_GEN_ALL_CORE_FT
levothyroxine 75 mcg (0.075 mg) oral tablet: 1 tab(s) orally once a day  olmesartan 20 mg oral tablet: 1 tab(s) orally once a day  rosuvastatin 10 mg oral tablet: 1 tab(s) orally once a day   Lasix 40 mg oral tablet: 1 tab(s) orally once a day   levothyroxine 75 mcg (0.075 mg) oral tablet: 1 tab(s) orally once a day  olmesartan 20 mg oral tablet: 1 tab(s) orally once a day  rosuvastatin 10 mg oral tablet: 1 tab(s) orally once a day

## 2021-12-22 NOTE — PROGRESS NOTE ADULT - ATTENDING COMMENTS
89 yo female with pmh of lung cancer s/p lobectomy is here for increased SOB and LE edema over the course of several months. CT chest showed unchanged consolidation slightly increased pleural effusion. LE duplex no DVT. No clinical e/o PNA (fever or leukocytosis). will obtain echo and start lasix. f/u pulm recs. PT eval
89 y/o F with PMH as above here with worsening dyspnea. CTA is similar to mild interval increase in the patchy opacities and areas of consolidation in the right upper and middle lobes when compared to prior examination.  POCUS revealed small pocket of fluid so unlikely cause of her dyspnea.  Etiology may be related to volume overload.  ECHO with findings c/f severe AS.  Symptoms are likely related to a valvulopathy.  Gentle diuresis as above.  Monitor I/Os and check daily weights. Rest of plan as above.
89 yo female with pmh of lung cancer s/p lobectomy is here for increased SOB and LE edema over the course of several months. CT chest showed unchanged consolidation slightly increased pleural effusion. LE duplex no DVT. No clinical e/o PNA (fever or leukocytosis). echo showing severe AS with preserved LVEF - will need cardiology eval, pending discussion with family inpt vs. outpt. c/w lasix iv 20

## 2021-12-22 NOTE — DISCHARGE NOTE NURSING/CASE MANAGEMENT/SOCIAL WORK - NSDCPEFALRISK_GEN_ALL_CORE
For information on Fall & Injury Prevention, visit: [email or web address] OR  [email or web address] OR  https://www.cdc.gov/steadi/patient.html

## 2021-12-22 NOTE — PHYSICAL THERAPY INITIAL EVALUATION ADULT - PATIENT PROFILE REVIEW, REHAB EVAL
No Formal Activity Order in the Computer; spoke with BJ Moise prior to PT evaluation--> Pt OK for PT consult/OOB activity./yes

## 2021-12-22 NOTE — PROGRESS NOTE ADULT - PROBLEM SELECTOR PLAN 5
--lovenox while inpt for ppx   - DASH diet   - full code  - PT eval
--lovenox while inpt for ppx   - DASH diet   - full code  - PT eval

## 2021-12-22 NOTE — PROGRESS NOTE ADULT - PROBLEM SELECTOR PLAN 1
Patient has chronic SOB, lethargy and intermittently dry cough   - CT imaging shows fairly consistent CT findings since June, with increase of right pleural effusion  - TTE w normal EF and severe AS   - F/U pulm reccs   - Strict I/O   - Gentle diuresis
Patient has chronic SOB, lethargy and intermittently dry cough   - CT imaging shows fairly consistent CT findings since June, with increase of right pleural effusion  - TTE w normal EF and severe AS   - F/U pulm reccs   - Strict I/O   - Gentle diuresis  - Patient should have eval for TAVR; will speak to granddaughter

## 2021-12-22 NOTE — PROGRESS NOTE ADULT - SUBJECTIVE AND OBJECTIVE BOX
Toni Mercedes MD  [phone]/[id number]      PROGRESS NOTE:     Patient is a 89y old  Female who presents with a chief complaint of SOB/Lethargy (22 Dec 2021 07:45)      SUBJECTIVE / OVERNIGHT EVENTS:    No acute events overnight.   Patient examined at bedside        MEDICATIONS  (STANDING):  atorvastatin 40 milliGRAM(s) Oral at bedtime  enoxaparin Injectable 40 milliGRAM(s) SubCutaneous at bedtime  furosemide   Injectable 20 milliGRAM(s) IV Push once  influenza  Vaccine (HIGH DOSE) 0.7 milliLiter(s) IntraMuscular once  levothyroxine 75 MICROGram(s) Oral daily  losartan 50 milliGRAM(s) Oral daily    MEDICATIONS  (PRN):  acetaminophen     Tablet .. 650 milliGRAM(s) Oral every 6 hours PRN Temp greater or equal to 38C (100.4F), Mild Pain (1 - 3)  aluminum hydroxide/magnesium hydroxide/simethicone Suspension 30 milliLiter(s) Oral every 4 hours PRN Dyspepsia  benzonatate 100 milliGRAM(s) Oral three times a day PRN Cough  melatonin 3 milliGRAM(s) Oral at bedtime PRN Insomnia  ondansetron Injectable 4 milliGRAM(s) IV Push every 8 hours PRN Nausea and/or Vomiting      CAPILLARY BLOOD GLUCOSE        I&O's Summary    21 Dec 2021 07:01  -  22 Dec 2021 07:00  --------------------------------------------------------  IN: 450 mL / OUT: 500 mL / NET: -50 mL        PHYSICAL EXAM:  Vital Signs Last 24 Hrs  T(C): 37.1 (22 Dec 2021 05:33), Max: 37.1 (22 Dec 2021 05:33)  T(F): 98.7 (22 Dec 2021 05:33), Max: 98.7 (22 Dec 2021 05:33)  HR: 85 (22 Dec 2021 05:33) (81 - 85)  BP: 121/58 (22 Dec 2021 05:33) (121/58 - 141/54)  BP(mean): --  RR: 18 (22 Dec 2021 05:33) (18 - 19)  SpO2: 95% (22 Dec 2021 05:33) (94% - 100%)    CONSTITUTIONAL: NAD; well-developed  HEENT: PERRL, clear conjunctiva  RESPIRATORY: Normal respiratory effort; lungs are clear to auscultation bilaterally; No Crackles/Rhonchi/Wheezing  CARDIOVASCULAR: Regular rate and rhythm, normal S1 and S2, no murmur/rub/gallop; No lower extremity edema; Peripheral pulses are 2+ bilaterally  ABDOMEN: Nontender to palpation, normoactive bowel sounds, no rebound/guarding; No hepatosplenomegaly  MUSCLOSKELETAL: no clubbing or cyanosis of digits; no joint swelling or tenderness to palpation  EXTREMITY: Lower extremities Non-tender to palpation; non-erythematous B/L  Neuro: A&Ox3; no focal deficits   Psych: normal mood; Affect appropirate    LABS:                        12.3   8.41  )-----------( 264      ( 20 Dec 2021 19:06 )             39.4     12-20    136  |  97<L>  |  5<L>  ----------------------------<  94  4.3   |  31  |  0.66    Ca    9.1      20 Dec 2021 19:06    TPro  8.0  /  Alb  3.4  /  TBili  0.2  /  DBili  x   /  AST  16  /  ALT  12  /  AlkPhos  96  12-20                RADIOLOGY & ADDITIONAL TESTS:  Results Reviewed:   Imaging Personally Reviewed:  Electrocardiogram Personally Reviewed:    COORDINATION OF CARE:  Care Discussed with Consultants/Other Providers [Y/N]:  Prior or Outpatient Records Reviewed [Y/N]:   Toni Mercedes MD  [phone]/[id number]      PROGRESS NOTE:     Patient is a 89y old  Female who presents with a chief complaint of SOB/Lethargy (22 Dec 2021 07:45)      SUBJECTIVE / OVERNIGHT EVENTS:  OVERNIGHT   - No issues     Patient was seen and examined at bedside this morning. Denies any nausea/vomiting/diarrhea, headache, abdominal pain or chest pain/palpitations. Patient responding appropriately to questions and able to make needs known. Vital signs/imaging reviewed. Patient still has SOB but it is always there.          MEDICATIONS  (STANDING):  atorvastatin 40 milliGRAM(s) Oral at bedtime  enoxaparin Injectable 40 milliGRAM(s) SubCutaneous at bedtime  furosemide   Injectable 20 milliGRAM(s) IV Push once  influenza  Vaccine (HIGH DOSE) 0.7 milliLiter(s) IntraMuscular once  levothyroxine 75 MICROGram(s) Oral daily  losartan 50 milliGRAM(s) Oral daily    MEDICATIONS  (PRN):  acetaminophen     Tablet .. 650 milliGRAM(s) Oral every 6 hours PRN Temp greater or equal to 38C (100.4F), Mild Pain (1 - 3)  aluminum hydroxide/magnesium hydroxide/simethicone Suspension 30 milliLiter(s) Oral every 4 hours PRN Dyspepsia  benzonatate 100 milliGRAM(s) Oral three times a day PRN Cough  melatonin 3 milliGRAM(s) Oral at bedtime PRN Insomnia  ondansetron Injectable 4 milliGRAM(s) IV Push every 8 hours PRN Nausea and/or Vomiting      CAPILLARY BLOOD GLUCOSE        I&O's Summary    21 Dec 2021 07:01  -  22 Dec 2021 07:00  --------------------------------------------------------  IN: 450 mL / OUT: 500 mL / NET: -50 mL        PHYSICAL EXAM:  Vital Signs Last 24 Hrs  T(C): 37.1 (22 Dec 2021 05:33), Max: 37.1 (22 Dec 2021 05:33)  T(F): 98.7 (22 Dec 2021 05:33), Max: 98.7 (22 Dec 2021 05:33)  HR: 85 (22 Dec 2021 05:33) (81 - 85)  BP: 121/58 (22 Dec 2021 05:33) (121/58 - 141/54)  BP(mean): --  RR: 18 (22 Dec 2021 05:33) (18 - 19)  SpO2: 95% (22 Dec 2021 05:33) (94% - 100%)    CONSTITUTIONAL: NAD; well-developed  HEENT: PERRL, clear conjunctiva  RESPIRATORY: Normal respiratory effort; lungs are clear to auscultation bilaterally; +Crakles R side   CARDIOVASCULAR: Regular rate and rhythm, normal S1 and S2, + murmur/rub/gallop; 2+ lower extremity edema; Peripheral pulses are 2+ bilaterally  ABDOMEN: Nontender to palpation, normoactive bowel sounds, no rebound/guarding; No hepatosplenomegaly  MUSCLOSKELETAL: no clubbing or cyanosis of digits; no joint swelling or tenderness to palpation  EXTREMITY: Lower extremities Non-tender to palpation; non-erythematous B/L  Neuro: A&Ox3; no focal deficits   Psych: normal mood; Affect appropirate    LABS:                        12.3   8.41  )-----------( 264      ( 20 Dec 2021 19:06 )             39.4     12-20    136  |  97<L>  |  5<L>  ----------------------------<  94  4.3   |  31  |  0.66    Ca    9.1      20 Dec 2021 19:06    TPro  8.0  /  Alb  3.4  /  TBili  0.2  /  DBili  x   /  AST  16  /  ALT  12  /  AlkPhos  96  12-20                RADIOLOGY & ADDITIONAL TESTS:  Results Reviewed:   Imaging Personally Reviewed:  Electrocardiogram Personally Reviewed:    COORDINATION OF CARE:  Care Discussed with Consultants/Other Providers [Y/N]:  Prior or Outpatient Records Reviewed [Y/N]:

## 2021-12-22 NOTE — PHYSICAL THERAPY INITIAL EVALUATION ADULT - ADDITIONAL COMMENTS
Pt reports that she lives alone in a private house with ~6 steps to enter; (+)bilateral handrails; and a flight of stairs to negotiate to bedroom; (+)1 handrail. Pt's daughter is staying with her 2/2 having home renovations  done. Prior to hospital admission pt was completely independent and used no assistive device with ambulation. She does own a rolling walker and single axis cane if she needs and reports that her last fall was last week.    Pt left comfortable in chair, NAD, all lines intact, all precautions maintained, and RN aware.

## 2021-12-22 NOTE — DISCHARGE NOTE PROVIDER - CARE PROVIDER_API CALL
Anjelica Huynh)  Cardiology; Internal Medicine  [phone] [place] [address], [address]  [place], [place] [id number]  Phone: [phone]  Fax: [phone]  Follow Up Time:    Anjelica Huynh)  Cardiology; Internal Medicine  [phone] [place] [address], [address]  [place], [place] [id number]  Phone: [phone]  Fax: [phone]  Follow Up Time:     Gonzalo Mendoza)  Medicine  Pulmonary Medicine  [address] [place] [address], [address]  [place] [address]  Phone: [phone]  Fax: [phone]  Follow Up Time:

## 2021-12-24 PROBLEM — E03.9 HYPOTHYROIDISM, UNSPECIFIED: Chronic | Status: ACTIVE | Noted: 2021-12-20

## 2021-12-24 PROBLEM — C34.90 MALIGNANT NEOPLASM OF UNSPECIFIED PART OF UNSPECIFIED BRONCHUS OR LUNG: Chronic | Status: ACTIVE | Noted: 2021-12-20

## 2021-12-24 PROBLEM — I10 ESSENTIAL (PRIMARY) HYPERTENSION: Chronic | Status: ACTIVE | Noted: 2021-12-20

## 2021-12-24 PROBLEM — E78.5 HYPERLIPIDEMIA, UNSPECIFIED: Chronic | Status: ACTIVE | Noted: 2021-12-21

## 2021-12-29 ENCOUNTER — APPOINTMENT (OUTPATIENT)
Dept: CARE COORDINATION | Facility: HOME HEALTH | Age: 86
End: 2021-12-29
Payer: MEDICARE

## 2021-12-29 DIAGNOSIS — E03.9 HYPOTHYROIDISM, UNSPECIFIED: ICD-10-CM

## 2021-12-29 DIAGNOSIS — R06.02 SHORTNESS OF BREATH: ICD-10-CM

## 2021-12-29 DIAGNOSIS — R63.0 ANOREXIA: ICD-10-CM

## 2021-12-29 DIAGNOSIS — I10 ESSENTIAL (PRIMARY) HYPERTENSION: ICD-10-CM

## 2021-12-29 PROCEDURE — 99496 TRANSJ CARE MGMT HIGH F2F 7D: CPT | Mod: 95

## 2021-12-29 RX ORDER — OLMESARTAN MEDOXOMIL 20 MG/1
20 TABLET, FILM COATED ORAL DAILY
Refills: 0 | Status: ACTIVE | COMMUNITY
Start: 2021-12-29

## 2021-12-29 RX ORDER — FUROSEMIDE 40 MG/1
40 TABLET ORAL DAILY
Refills: 0 | Status: ACTIVE | COMMUNITY
Start: 2021-12-29

## 2021-12-29 RX ORDER — OXYCODONE AND ACETAMINOPHEN 5; 325 MG/1; MG/1
5-325 TABLET ORAL
Qty: 28 | Refills: 0 | Status: DISCONTINUED | COMMUNITY
Start: 2020-11-30 | End: 2021-12-29

## 2021-12-29 NOTE — HISTORY OF PRESENT ILLNESS
[Other: _____] : [unfilled] [FreeTextEntry1] : Ms. Jasmin Hendricksmorriscecily is being seen for telecommunication video visit for CC of "poor appetite" and f/u post hospital discharge.  [de-identified] : Ms. Jasmin Ness is a 88 y/o female with PMH of lung cancer s/p lobectomy (no chemo or radiation), HTN, HLD, and hypothyroid sent in by pulmonologist to be evaluated for c/o several month hx of worsening dyspnea, cough, fatigue and swelling of her lower extremities. She was admitted from 12/21 to 12/22, found to have significant edema on her lower extremities, small-moderate pleural effusion on right side similar to previous imaging studies and TTE showed EF 64% with Severe Aortic Stenosis. She was diuresed with IV Lasix and discharged on PO Lasix and recommend follow up with Dr. Huynh for further evaluation of aortic stenosis. \par \par Since discharge to home, appetite remains poor, which she attributes to a "metallic taste" in her mouth. Denies any change in diet, nausea, vomiting, constipation, diarrhea, heartburn, belching, loss in taste/smell or any new medications aside from Lasix. Denies any recent travel or known sick contacts and is covid vaccinated with booster. She states she is doing okay otherwise, cough improved, baseline BOND - not worsen and LE edema improved significantly on prescribed medications. She is receiving home care services and encouraged to increase activity as tolerated. \par \par She has upcoming appts scheduled with pulmonary Dr. Mendoza 1/6/2022 and cardiologist Dr. Huynh 2/2/2022

## 2021-12-29 NOTE — REVIEW OF SYSTEMS
[Lower Ext Edema] : lower extremity edema [Dyspnea on Exertion] : dyspnea on exertion [Muscle Weakness] : muscle weakness [Negative] : Heme/Lymph [Fever] : no fever [Chills] : no chills [Shortness Of Breath] : no shortness of breath [Wheezing] : no wheezing [FreeTextEntry6] : nonproductive cough

## 2021-12-29 NOTE — PHYSICAL EXAM
[No Acute Distress] : no acute distress [Well Nourished] : well nourished [Well Developed] : well developed [Well-Appearing] : well-appearing [Normal Sclera/Conjunctiva] : normal sclera/conjunctiva [Normal Outer Ear/Nose] : the outer ears and nose were normal in appearance [No Respiratory Distress] : no respiratory distress  [No Accessory Muscle Use] : no accessory muscle use [Normal Affect] : the affect was normal [Alert and Oriented x3] : oriented to person, place, and time [Normal Insight/Judgement] : insight and judgment were intact [de-identified] : Physical exam limited d/t telehealth encounter. Patient is A&Ox3

## 2021-12-29 NOTE — PLAN
[FreeTextEntry1] : [employer] TCM STARS teaching: Adhere to low salt diet and educated patient on foods that should be avoided such as processed or fast food. Continue medications as ordered. Follow up with Cardiologist 2/2/2022, and pulmonologist 1/6/2022. Contact information given, patient advised to call with any questions/concerns.

## 2022-01-06 ENCOUNTER — APPOINTMENT (OUTPATIENT)
Dept: PULMONOLOGY | Facility: CLINIC | Age: 87
End: 2022-01-06
Payer: MEDICARE

## 2022-01-06 DIAGNOSIS — F41.9 ANXIETY DISORDER, UNSPECIFIED: ICD-10-CM

## 2022-01-06 DIAGNOSIS — R60.0 LOCALIZED EDEMA: ICD-10-CM

## 2022-01-06 PROCEDURE — 99213 OFFICE O/P EST LOW 20 MIN: CPT | Mod: 95

## 2022-01-06 RX ORDER — FUROSEMIDE 40 MG/1
40 TABLET ORAL
Qty: 50 | Refills: 0 | Status: ACTIVE | COMMUNITY
Start: 2022-01-06 | End: 1900-01-01

## 2022-01-06 RX ORDER — ALPRAZOLAM 1 MG/1
1 TABLET ORAL
Qty: 60 | Refills: 0 | Status: ACTIVE | COMMUNITY
Start: 2022-01-06 | End: 1900-01-01

## 2022-01-06 NOTE — REVIEW OF SYSTEMS
[Fatigue] : fatigue [SOB on Exertion] : sob on exertion [Negative] : Gastrointestinal [TextBox_44] : as in history of present illness

## 2022-01-06 NOTE — ASSESSMENT
[FreeTextEntry1] : I reviewed her current medications and we'll continue to withhold antihypertensive. Her heart failure is much improved and I reduced the furosemide to 40 mg 3 times a week. I suggested that the patient be weighed daily and if her weight increases by more than 3 pounds and was associated with increasing peripheral edema, her daughter should increase the diuretic. Daughter also reported that her mother was having anxiety attacks and I suggested a trial of alprazolam.\par I will follow up with her in the office in 3 weeks. She knows that if any problem arises in the interim, she will call the office and arrange a followup visit.

## 2022-01-06 NOTE — REASON FOR VISIT
[Home] : at home, [unfilled] , at the time of the visit. [Medical Office: ([facility])___] : at the medical office located in  [Family Member] : family member [Follow-Up - From Hospitalization] : a follow-up visit after a recent hospitalization [Lung Cancer] : lung cancer [Shortness of Breath] : shortness of breath

## 2022-01-06 NOTE — HISTORY OF PRESENT ILLNESS
[TextBox_4] : 89 -year-old female recently discharged in a hospital where she had been admitted in congestive heart failure. The patient has underlying lung cancer stage IV that is progressing very slowly. She had prior surgery but had a recurrence in the same lung and declined any further therapy. I saw her several weeks ago because of an increasing pleural effusion and peripheral edema for which she was admitted to the hospital. At that point she was noted to have severe aortic stenosis and she was diuresis with improvement of her right pleural effusion and peripheral edema. Currently she reports very little in the way of peripheral edema. She feels weak and occasionally still dyspneic on exertion. She stopped her antihypertensive medication because of hypotension. Her daughter is currently living with her and she anticipated and the toes

## 2022-01-16 ENCOUNTER — NON-APPOINTMENT (OUTPATIENT)
Age: 87
End: 2022-01-16

## 2022-01-24 DIAGNOSIS — J18.9 PNEUMONIA, UNSPECIFIED ORGANISM: ICD-10-CM

## 2022-01-24 DIAGNOSIS — M79.89 OTHER SPECIFIED SOFT TISSUE DISORDERS: ICD-10-CM

## 2022-01-24 DIAGNOSIS — C34.90 MALIGNANT NEOPLASM OF UNSPECIFIED PART OF UNSPECIFIED BRONCHUS OR LUNG: ICD-10-CM

## 2022-01-24 RX ORDER — ROSUVASTATIN CALCIUM 10 MG/1
10 TABLET, FILM COATED ORAL
Qty: 90 | Refills: 3 | Status: ACTIVE | COMMUNITY
Start: 2021-12-29

## 2022-01-24 RX ORDER — LEVOTHYROXINE SODIUM 0.07 MG/1
75 TABLET ORAL
Qty: 30 | Refills: 2 | Status: ACTIVE | COMMUNITY
Start: 2020-09-29

## 2022-01-27 ENCOUNTER — APPOINTMENT (OUTPATIENT)
Dept: PULMONOLOGY | Facility: CLINIC | Age: 87
End: 2022-01-27
Payer: MEDICARE

## 2022-01-27 VITALS
HEART RATE: 90 BPM | RESPIRATION RATE: 16 BRPM | TEMPERATURE: 98.7 F | SYSTOLIC BLOOD PRESSURE: 117 MMHG | HEIGHT: 61 IN | DIASTOLIC BLOOD PRESSURE: 71 MMHG

## 2022-01-27 DIAGNOSIS — J90 PLEURAL EFFUSION, NOT ELSEWHERE CLASSIFIED: ICD-10-CM

## 2022-01-27 DIAGNOSIS — C34.90 MALIGNANT NEOPLASM OF UNSPECIFIED PART OF UNSPECIFIED BRONCHUS OR LUNG: ICD-10-CM

## 2022-01-27 DIAGNOSIS — I35.0 NONRHEUMATIC AORTIC (VALVE) STENOSIS: ICD-10-CM

## 2022-01-27 PROCEDURE — 99214 OFFICE O/P EST MOD 30 MIN: CPT

## 2022-01-27 NOTE — ASSESSMENT
[FreeTextEntry1] : the patient has a significant right pleural effusion as well as peripheral edema likely related to both there are adenocarcinoma of the right lung as well as to heart failure. I would like to increase her diuretics but first we'll get blood tests. She may need potassium supplementation. If the diuretics did not improve her situation, I would consider a thoracentesis. I discussed hospice with the granddaughter\par I will also order supplemental oxygen since her resting oxygen saturation was 87%

## 2022-01-27 NOTE — PHYSICAL EXAM
[Normal Appearance] : normal appearance [Normal Rate/Rhythm] : normal rate/rhythm [2+ Pitting] : 2+ pitting [TextBox_2] : in respiratory distress [TextBox_54] : 3/6 systolic ejection murmur [TextBox_68] : dullness to percussion and decreased breath sounds with egophony over right lower lobe [TextBox_105] : clubbing

## 2022-01-27 NOTE — HISTORY OF PRESENT ILLNESS
[TextBox_4] : 89 -year-old female with stage IV lung cancer and congestive heart failure likely related to severe aortic stenosis. She was hospitalized and diuresed and improved somewhat but comes in today with dyspnea at rest and an oxygen saturation of 87%. She is seated in a wheelchair with tachypnea and expiratory grunting. She came in with her granddaughter who is in the health field. She had been placed on Lasix every other day but more recently had increased it so daily because of persistent peripheral edema.

## 2022-01-27 NOTE — REVIEW OF SYSTEMS
[Fatigue] : fatigue [SOB on Exertion] : sob on exertion [Chest Discomfort] : chest discomfort [Negative] : Gastrointestinal [TextBox_69] : no appetite

## 2022-01-27 NOTE — END OF VISIT
[FreeTextEntry3] : I spent 30 minutes on this encounter including face-to-face time, I review her hospitalization records including imaging and including discussion time with granddaughter [Time Spent: ___ minutes] : I have spent [unfilled] minutes of time on the encounter.

## 2022-01-28 LAB
ALBUMIN SERPL ELPH-MCNC: 2.9 G/DL
ALP BLD-CCNC: 122 U/L
ALT SERPL-CCNC: 7 U/L
ANION GAP SERPL CALC-SCNC: 13 MMOL/L
AST SERPL-CCNC: 15 U/L
BILIRUB SERPL-MCNC: 0.2 MG/DL
BUN SERPL-MCNC: 10 MG/DL
CALCIUM SERPL-MCNC: 8.5 MG/DL
CHLORIDE SERPL-SCNC: 86 MMOL/L
CO2 SERPL-SCNC: 35 MMOL/L
CREAT SERPL-MCNC: 0.67 MG/DL
POTASSIUM SERPL-SCNC: 4.1 MMOL/L
PROT SERPL-MCNC: 6.8 G/DL
SODIUM SERPL-SCNC: 133 MMOL/L

## 2022-02-02 ENCOUNTER — APPOINTMENT (OUTPATIENT)
Dept: CARDIOLOGY | Facility: CLINIC | Age: 87
End: 2022-02-02

## 2022-02-04 ENCOUNTER — NON-APPOINTMENT (OUTPATIENT)
Age: 87
End: 2022-02-04

## 2022-02-11 ENCOUNTER — NON-APPOINTMENT (OUTPATIENT)
Age: 87
End: 2022-02-11

## 2022-03-01 RX ORDER — ZOLPIDEM TARTRATE 10 MG/1
10 TABLET ORAL
Qty: 30 | Refills: 3 | Status: ACTIVE | COMMUNITY
Start: 2022-03-01 | End: 1900-01-01

## 2024-09-13 NOTE — CHART NOTE - NSCHARTNOTEFT_GEN_A_CORE
: Satinder Mallory Korotun    INDICATION: Respiratory failure      PROCEDURE:    [ x] LIMITED ECHO    [ x] LIMITED CHEST    FINDINGS:  Right sided small pleural effusion with atelectatic lung with static airbronchograms floating within  Difficult to obtain PSSA, PSLA and AP4 view. LV>RV. Hyperechoic Aortic valve    INTERPRETATION:  Right sided small pleural effusion with atelectatic lung floating within too small to access for thoracentesis  Aortic valve appears stenotic    F/u official TTE    Images stored on [employer] No celiac no H. pylori  Tubular adenoma colon polyp  Colonoscopy recall 5 years : Satinder Mallory Korotun    INDICATION: Respiratory failure      PROCEDURE:    [ x] LIMITED ECHO    [ x] LIMITED CHEST    FINDINGS:  Right sided small pleural effusion with atelectatic lung with static airbronchograms floating within  Difficult to obtain PSSA, PSLA and AP4 view. LV>RV. Hyperechoic Aortic valve    INTERPRETATION:  Right sided small pleural effusion with atelectatic lung floating within too small to access for thoracentesis  Aortic valve appears stenotic    F/u official TTE    Images uploaded on [employer]    Attending Attestation:  I was present during the key portions of the procedure and immediately available during the entire procedure.  Mak Gutierrez DO  Attending  Pulmonary & Critical Care Medicine